# Patient Record
Sex: FEMALE | Race: WHITE | NOT HISPANIC OR LATINO | Employment: OTHER | URBAN - METROPOLITAN AREA
[De-identification: names, ages, dates, MRNs, and addresses within clinical notes are randomized per-mention and may not be internally consistent; named-entity substitution may affect disease eponyms.]

---

## 2019-07-15 ENCOUNTER — HOSPITAL ENCOUNTER (EMERGENCY)
Facility: HOSPITAL | Age: 64
Discharge: HOME | End: 2019-07-15
Attending: EMERGENCY MEDICINE
Payer: MEDICARE

## 2019-07-15 ENCOUNTER — APPOINTMENT (EMERGENCY)
Dept: CT IMAGING | Facility: HOSPITAL | Age: 64
End: 2019-07-15
Payer: MEDICARE

## 2019-07-15 VITALS
RESPIRATION RATE: 19 BRPM | WEIGHT: 150 LBS | DIASTOLIC BLOOD PRESSURE: 74 MMHG | SYSTOLIC BLOOD PRESSURE: 132 MMHG | HEIGHT: 61 IN | BODY MASS INDEX: 28.32 KG/M2 | OXYGEN SATURATION: 97 % | HEART RATE: 66 BPM | TEMPERATURE: 98.9 F

## 2019-07-15 DIAGNOSIS — R13.10 DYSPHAGIA: Primary | ICD-10-CM

## 2019-07-15 LAB
ANION GAP SERPL CALCULATED.3IONS-SCNC: 10 MMOL/L (ref 4–13)
BASOPHILS # BLD AUTO: 0.03 THOUSANDS/ΜL (ref 0–0.1)
BASOPHILS NFR BLD AUTO: 0 % (ref 0–1)
BUN SERPL-MCNC: 12 MG/DL (ref 5–25)
CALCIUM SERPL-MCNC: 9.6 MG/DL (ref 8.3–10.1)
CHLORIDE SERPL-SCNC: 108 MMOL/L (ref 100–108)
CO2 SERPL-SCNC: 26 MMOL/L (ref 21–32)
CREAT SERPL-MCNC: 0.77 MG/DL (ref 0.6–1.3)
EOSINOPHIL # BLD AUTO: 0.11 THOUSAND/ΜL (ref 0–0.61)
EOSINOPHIL NFR BLD AUTO: 2 % (ref 0–6)
ERYTHROCYTE [DISTWIDTH] IN BLOOD BY AUTOMATED COUNT: 13 % (ref 11.6–15.1)
GFR SERPL CREATININE-BSD FRML MDRD: 81 ML/MIN/1.73SQ M
GLUCOSE SERPL-MCNC: 105 MG/DL (ref 65–140)
HCT VFR BLD AUTO: 43.1 % (ref 34.8–46.1)
HGB BLD-MCNC: 14 G/DL (ref 11.5–15.4)
IMM GRANULOCYTES # BLD AUTO: 0.05 THOUSAND/UL (ref 0–0.2)
IMM GRANULOCYTES NFR BLD AUTO: 1 % (ref 0–2)
LYMPHOCYTES # BLD AUTO: 2.25 THOUSANDS/ΜL (ref 0.6–4.47)
LYMPHOCYTES NFR BLD AUTO: 30 % (ref 14–44)
MCH RBC QN AUTO: 29.6 PG (ref 26.8–34.3)
MCHC RBC AUTO-ENTMCNC: 32.5 G/DL (ref 31.4–37.4)
MCV RBC AUTO: 91 FL (ref 82–98)
MONOCYTES # BLD AUTO: 0.6 THOUSAND/ΜL (ref 0.17–1.22)
MONOCYTES NFR BLD AUTO: 8 % (ref 4–12)
NEUTROPHILS # BLD AUTO: 4.45 THOUSANDS/ΜL (ref 1.85–7.62)
NEUTS SEG NFR BLD AUTO: 59 % (ref 43–75)
NRBC BLD AUTO-RTO: 0 /100 WBCS
PLATELET # BLD AUTO: 213 THOUSANDS/UL (ref 149–390)
PMV BLD AUTO: 11.6 FL (ref 8.9–12.7)
POTASSIUM SERPL-SCNC: 4.7 MMOL/L (ref 3.5–5.3)
RBC # BLD AUTO: 4.73 MILLION/UL (ref 3.81–5.12)
SODIUM SERPL-SCNC: 144 MMOL/L (ref 136–145)
WBC # BLD AUTO: 7.49 THOUSAND/UL (ref 4.31–10.16)

## 2019-07-15 PROCEDURE — 99284 EMERGENCY DEPT VISIT MOD MDM: CPT | Performed by: EMERGENCY MEDICINE

## 2019-07-15 PROCEDURE — 99284 EMERGENCY DEPT VISIT MOD MDM: CPT

## 2019-07-15 PROCEDURE — 85025 COMPLETE CBC W/AUTO DIFF WBC: CPT | Performed by: EMERGENCY MEDICINE

## 2019-07-15 PROCEDURE — 80048 BASIC METABOLIC PNL TOTAL CA: CPT | Performed by: EMERGENCY MEDICINE

## 2019-07-15 PROCEDURE — 71260 CT THORAX DX C+: CPT

## 2019-07-15 PROCEDURE — 70491 CT SOFT TISSUE NECK W/DYE: CPT

## 2019-07-15 PROCEDURE — 36415 COLL VENOUS BLD VENIPUNCTURE: CPT | Performed by: EMERGENCY MEDICINE

## 2019-07-15 RX ADMIN — IOHEXOL 100 ML: 350 INJECTION, SOLUTION INTRAVENOUS at 16:27

## 2019-07-15 NOTE — ED PROVIDER NOTES
History  Chief Complaint   Patient presents with    Difficulty Swallowing     pt c/o sore throt and pain with swallowing after choking episode 2 days ago     70year old female patient presents emergency department for evaluation of difficulty swallowing  This is episodic, it is not consistent, this happened twice over the last several days and was concerned the patient  She currently has no difficulty speaking, she has no difficulty holding or in secretions, I do not feel this is a food bolus, I am concerned more for dysphagia secondary to esophageal stricture by the patient's description  She has no difficulty tolerating liquids, no difficulty tolerating other solids other than these two episodes  Because her description, CT scan of the soft tissue neck and chest were done to assess for any kind of a mass which may be obstructing if not is present patient follow up with GI for evaluation dysphagia  History provided by:  Patient   used: No    Medical Problem   Severity:  Mild  Onset quality:  Gradual  Timing:  Constant  Chronicity:  New  Associated symptoms: no abdominal pain, no ear pain, no fever, no myalgias and no wheezing        None       Past Medical History:   Diagnosis Date    Migraines        Past Surgical History:   Procedure Laterality Date    HYSTERECTOMY         History reviewed  No pertinent family history  I have reviewed and agree with the history as documented  Social History     Tobacco Use    Smoking status: Never Smoker    Smokeless tobacco: Never Used   Substance Use Topics    Alcohol use: Not Currently    Drug use: Never        Review of Systems   Constitutional: Negative for fever  HENT: Negative for ear pain  Respiratory: Negative for wheezing  Gastrointestinal: Negative for abdominal pain  Musculoskeletal: Negative for myalgias  All other systems reviewed and are negative        Physical Exam  Physical Exam   Constitutional: She is oriented to person, place, and time  She appears well-developed and well-nourished  HENT:   Head: Normocephalic and atraumatic  Right Ear: External ear normal    Left Ear: External ear normal    Eyes: Conjunctivae and EOM are normal    Neck: No JVD present  No tracheal deviation present  No thyromegaly present  Cardiovascular: Normal rate  Pulmonary/Chest: Effort normal and breath sounds normal  No stridor  Abdominal: Soft  She exhibits no distension and no mass  There is no tenderness  There is no guarding  No hernia  Musculoskeletal: Normal range of motion  She exhibits no edema, tenderness or deformity  Lymphadenopathy:     She has no cervical adenopathy  Neurological: She is alert and oriented to person, place, and time  Skin: Skin is warm  No rash noted  No erythema  No pallor  Psychiatric: She has a normal mood and affect  Her behavior is normal    Nursing note and vitals reviewed        Vital Signs  ED Triage Vitals [07/15/19 1206]   Temperature Pulse Respirations Blood Pressure SpO2   98 9 °F (37 2 °C) 73 18 152/73 96 %      Temp Source Heart Rate Source Patient Position - Orthostatic VS BP Location FiO2 (%)   Oral Monitor Sitting Left arm --      Pain Score       --           Vitals:    07/15/19 1206   BP: 152/73   Pulse: 73   Patient Position - Orthostatic VS: Sitting         Visual Acuity      ED Medications  Medications   iohexol (OMNIPAQUE) 350 MG/ML injection (MULTI-DOSE) 100 mL (100 mL Intravenous Given 7/15/19 1627)       Diagnostic Studies  Results Reviewed     Procedure Component Value Units Date/Time    Basic metabolic panel [[id number]] Collected:  07/15/19 1523    Lab Status:  Final result Specimen:  Blood from Hand, Left Updated:  07/15/19 1539     Sodium 144 mmol/L      Potassium 4 7 mmol/L      Chloride 108 mmol/L      CO2 26 mmol/L      ANION GAP 10 mmol/L      BUN 12 mg/dL      Creatinine 0 77 mg/dL      Glucose 105 mg/dL      Calcium 9 6 mg/dL      eGFR 81 ml/min/1 73sq m Narrative:       National Kidney Disease Foundation guidelines for Chronic Kidney Disease (CKD):     Stage 1 with normal or high GFR (GFR > 90 mL/min/1 73 square meters)    Stage 2 Mild CKD (GFR = 60-89 mL/min/1 73 square meters)    Stage 3A Moderate CKD (GFR = 45-59 mL/min/1 73 square meters)    Stage 3B Moderate CKD (GFR = 30-44 mL/min/1 73 square meters)    Stage 4 Severe CKD (GFR = 15-29 mL/min/1 73 square meters)    Stage 5 End Stage CKD (GFR <15 mL/min/1 73 square meters)  Note: GFR calculation is accurate only with a steady state creatinine    CBC and differential [[id number]] Collected:  07/15/19 1454    Lab Status:  Final result Specimen:  Blood from Arm, Left Updated:  07/15/19 1507     WBC 7 49 Thousand/uL      RBC 4 73 Million/uL      Hemoglobin 14 0 g/dL      Hematocrit 43 1 %      MCV 91 fL      MCH 29 6 pg      MCHC 32 5 g/dL      RDW 13 0 %      MPV 11 6 fL      Platelets 522 Thousands/uL      nRBC 0 /100 WBCs      Neutrophils Relative 59 %      Immat GRANS % 1 %      Lymphocytes Relative 30 %      Monocytes Relative 8 %      Eosinophils Relative 2 %      Basophils Relative 0 %      Neutrophils Absolute 4 45 Thousands/µL      Immature Grans Absolute 0 05 Thousand/uL      Lymphocytes Absolute 2 25 Thousands/µL      Monocytes Absolute 0 60 Thousand/µL      Eosinophils Absolute 0 11 Thousand/µL      Basophils Absolute 0 03 Thousands/µL                  CT soft tissue neck with contrast   Final Result by Trixie Pike MD (07/15 1656)      No airway pathology or pathologic adenopathy  Workstation performed: [id number]         CT chest with contrast   Final Result by Sharyn Nazario MD (07/15 1650)      No findings to account for this patient's difficulty swallowing           Workstation performed: [id number]                    Procedures  Procedures       ED Course                               MDM  Number of Diagnoses or Management Options  Dysphagia: new and requires workup Amount and/or Complexity of Data Reviewed  Decide to obtain previous medical records or to obtain history from someone other than the patient: yes  Review and summarize past medical records: yes    Patient Progress  Patient progress: stable      Disposition  Final diagnoses:   Dysphagia     Time reflects when diagnosis was documented in both MDM as applicable and the Disposition within this note     Time User Action Codes Description Comment    7/15/2019  5:16 PM Paty Jerome Add [R13 10] Dysphagia       ED Disposition     ED Disposition Condition Date/Time Comment    Discharge Stable Mon Jul 15, 2019  5:16 PM Zuleyka Mcclendon discharge to home/self care  Follow-up Information     Follow up With Specialties Details Why Contact Info    Marleny Marie MD Gastroenterology   [address]  [address]  [phone]            Patient's Medications    No medications on file     No discharge procedures on file      ED Provider  Electronically Signed by           Jonna Juárez DO  07/15/19 9850

## 2019-08-23 ENCOUNTER — OFFICE VISIT (OUTPATIENT)
Dept: GASTROENTEROLOGY | Facility: CLINIC | Age: 64
End: 2019-08-23
Payer: MEDICARE

## 2019-08-23 ENCOUNTER — PREP FOR PROCEDURE (OUTPATIENT)
Dept: GASTROENTEROLOGY | Facility: CLINIC | Age: 64
End: 2019-08-23

## 2019-08-23 VITALS
BODY MASS INDEX: 29.95 KG/M2 | WEIGHT: 158.5 LBS | SYSTOLIC BLOOD PRESSURE: 122 MMHG | HEART RATE: 62 BPM | DIASTOLIC BLOOD PRESSURE: 78 MMHG

## 2019-08-23 DIAGNOSIS — R13.19 ESOPHAGEAL DYSPHAGIA: Primary | ICD-10-CM

## 2019-08-23 DIAGNOSIS — Z12.11 COLON CANCER SCREENING: ICD-10-CM

## 2019-08-23 PROCEDURE — 99204 OFFICE O/P NEW MOD 45 MIN: CPT | Performed by: INTERNAL MEDICINE

## 2019-08-23 RX ORDER — LORAZEPAM 1 MG/1
1 TABLET ORAL 2 TIMES DAILY PRN
Refills: 0 | COMMUNITY
Start: 2019-06-26

## 2019-08-23 RX ORDER — OXYBUTYNIN CHLORIDE 15 MG/1
15 TABLET, EXTENDED RELEASE ORAL DAILY
Refills: 1 | COMMUNITY
Start: 2019-07-20

## 2019-08-23 RX ORDER — ATORVASTATIN CALCIUM 20 MG/1
TABLET, FILM COATED ORAL
Refills: 0 | COMMUNITY
Start: 2019-07-22

## 2019-08-23 RX ORDER — PANTOPRAZOLE SODIUM 40 MG/1
40 TABLET, DELAYED RELEASE ORAL DAILY
Qty: 30 TABLET | Refills: 1 | Status: SHIPPED | OUTPATIENT
Start: 2019-08-23 | End: 2019-09-14 | Stop reason: SDUPTHER

## 2019-08-23 RX ORDER — NAPROXEN 500 MG/1
TABLET ORAL AS NEEDED
COMMUNITY
Start: 2019-08-19

## 2019-08-23 RX ORDER — DICLOFENAC SODIUM 75 MG/1
75 TABLET, DELAYED RELEASE ORAL 2 TIMES DAILY
Refills: 0 | COMMUNITY
Start: 2019-05-20

## 2019-08-23 NOTE — H&P (VIEW-ONLY)
Consultation - [facility]  Bina Mendez 1955 70 y o  female     ASSESSMENT @ PLAN:   She is a 75-year-old female with progressive solid food dysphagia and neck globus sensation over the last month without overt peptic symptoms  In addition she has never had colon cancer screening and she has no family history and no symptoms  1 will do EGD and colonoscopy to investigate    2 will give Protonix 40 mg daily for 8 weeks    Chief Complaint:  Dysphagia and colon cancer screening    HPI:   She is a 75-year-old female with ongoing solid food dysphagia that is becoming constant  She reports that is been ongoing for about a month  She originally noticed it when she started eating rice and now she has it with any solid food  She reports a tightness and a fullness in her neck  She reports almost a globus sensation at all times  She has no overt peptic symptoms  She has no heartburn regurgitation no nausea no vomiting  No belching  She denies NSAID use  She has no melena or weight loss  She has never had an endoscopy or colonoscopy  She had an emergency room visit and she had a CT of the chest and the neck that were negative  She has nobody in the family has colon cancer screening she needs colon cancer screening  She has no diarrhea constipation melena hematochezia or abdominal pain  REVIEW OF SYSTEMS:     CONSTITUTIONAL: Denies any fever, chills, or rigors  Good appetite, and no recent weight loss  HEENT: No earache or tinnitus  Denies hearing loss or visual disturbances  CARDIOVASCULAR: No chest pain or palpitations  RESPIRATORY: Denies any cough, hemoptysis, shortness of breath or dyspnea on exertion  GASTROINTESTINAL: As noted in the History of Present Illness  GENITOURINARY: No problems with urination  Denies any hematuria or dysuria  NEUROLOGIC: No dizziness or vertigo, denies headaches  MUSCULOSKELETAL: Denies any muscle or joint pain     SKIN: Denies skin rashes or itching  ENDOCRINE: Denies excessive thirst  Denies intolerance to heat or cold  PSYCHOSOCIAL: Denies depression or anxiety  Denies any recent memory loss  Past Medical History:   Diagnosis Date    Hyperlipidemia     Migraines       Past Surgical History:   Procedure Laterality Date     SECTION      two     HYSTERECTOMY      REPLACEMENT TOTAL KNEE      SHOULDER ARTHROSCOPY       Social History     Socioeconomic History    Marital status: /Civil Union     Spouse name: Not on file    Number of children: Not on file    Years of education: Not on file    Highest education level: Not on file   Occupational History    Not on file   Social Needs    Financial resource strain: Not on file    Food insecurity:     Worry: Not on file     Inability: Not on file    Transportation needs:     Medical: Not on file     Non-medical: Not on file   Tobacco Use    Smoking status: Never Smoker    Smokeless tobacco: Never Used   Substance and Sexual Activity    Alcohol use: Not Currently    Drug use: Never    Sexual activity: Not on file   Lifestyle    Physical activity:     Days per week: Not on file     Minutes per session: Not on file    Stress: Not on file   Relationships    Social connections:     Talks on phone: Not on file     Gets together: Not on file     Attends Holiness service: Not on file     Active member of club or organization: Not on file     Attends meetings of clubs or organizations: Not on file     Relationship status: Not on file    Intimate partner violence:     Fear of current or ex partner: Not on file     Emotionally abused: Not on file     Physically abused: Not on file     Forced sexual activity: Not on file   Other Topics Concern    Not on file   Social History Narrative    Not on file     Family History   Problem Relation Age of Onset    Diabetes Father      Patient has no known allergies    Current Outpatient Medications   Medication Sig Dispense Refill    atorvastatin (LIPITOR) 20 mg tablet TAKE 1 TABLET BY MOUTH EVERY DAY AT NIGHT  0    diclofenac (VOLTAREN) 75 mg EC tablet Take 75 mg by mouth 2 (two) times a day  0    LORazepam (ATIVAN) 1 mg tablet Take 1 mg by mouth 2 (two) times a day as needed  0    naproxen (NAPROSYN) 500 mg tablet as needed      oxybutynin (DITROPAN XL) 15 MG 24 hr tablet Take 15 mg by mouth daily  1    patient supplied medication       topiramate (TOPAMAX) 200 MG tablet Take 200 mg by mouth 2 (two) times a day  1    pantoprazole (PROTONIX) 40 mg tablet Take 1 tablet (40 mg total) by mouth daily 30 tablet 1     No current facility-administered medications for this visit  Blood pressure 122/78, pulse 62, weight 71 9 kg (158 lb 8 oz)  PHYSICAL EXAM:     General Appearance:   Alert, cooperative, no distress, appears stated age    HEENT:   Normocephalic, atraumatic, anicteric      Neck:  Supple, symmetrical, trachea midline, no adenopathy;    thyroid: no enlargement/tenderness/nodules; no carotid  bruit or JVD    Lungs:   Clear to auscultation bilaterally; no rales, rhonchi or wheezing; respirations unlabored    Heart[de-identified]   S1 and S2 normal; regular rate and rhythm; no murmur, rub, or gallop     Abdomen:   Soft, non-tender, non-distended; normal bowel sounds; no masses, no organomegaly    Genitalia:   Deferred    Rectal:   Deferred    Extremities:  No cyanosis, clubbing or edema    Pulses:  2+ and symmetric all extremities    Skin:  Skin color, texture, turgor normal, no rashes or lesions    Lymph nodes:  No palpable cervical, axillary or inguinal lymphadenopathy        Lab Results   Component Value Date    WBC 7 49 07/15/2019    HGB 14 0 07/15/2019    HCT 43 1 07/15/2019    MCV 91 07/15/2019     07/15/2019     Lab Results   Component Value Date    CALCIUM 9 6 07/15/2019    K 4 7 07/15/2019    CO2 26 07/15/2019     07/15/2019    BUN 12 07/15/2019    CREATININE 0 77 07/15/2019     No results found for: ALT, AST, GGT, ALKPHOS, BILITOT  No results found for: INR, PROTIME

## 2019-08-23 NOTE — PROGRESS NOTES
Consultation - [facility]  Bina Mendez 1955 70 y o  female     ASSESSMENT @ PLAN:   She is a 57-year-old female with progressive solid food dysphagia and neck globus sensation over the last month without overt peptic symptoms  In addition she has never had colon cancer screening and she has no family history and no symptoms  1 will do EGD and colonoscopy to investigate    2 will give Protonix 40 mg daily for 8 weeks    Chief Complaint:  Dysphagia and colon cancer screening    HPI:   She is a 57-year-old female with ongoing solid food dysphagia that is becoming constant  She reports that is been ongoing for about a month  She originally noticed it when she started eating rice and now she has it with any solid food  She reports a tightness and a fullness in her neck  She reports almost a globus sensation at all times  She has no overt peptic symptoms  She has no heartburn regurgitation no nausea no vomiting  No belching  She denies NSAID use  She has no melena or weight loss  She has never had an endoscopy or colonoscopy  She had an emergency room visit and she had a CT of the chest and the neck that were negative  She has nobody in the family has colon cancer screening she needs colon cancer screening  She has no diarrhea constipation melena hematochezia or abdominal pain  REVIEW OF SYSTEMS:     CONSTITUTIONAL: Denies any fever, chills, or rigors  Good appetite, and no recent weight loss  HEENT: No earache or tinnitus  Denies hearing loss or visual disturbances  CARDIOVASCULAR: No chest pain or palpitations  RESPIRATORY: Denies any cough, hemoptysis, shortness of breath or dyspnea on exertion  GASTROINTESTINAL: As noted in the History of Present Illness  GENITOURINARY: No problems with urination  Denies any hematuria or dysuria  NEUROLOGIC: No dizziness or vertigo, denies headaches  MUSCULOSKELETAL: Denies any muscle or joint pain     SKIN: Denies skin rashes or itching  ENDOCRINE: Denies excessive thirst  Denies intolerance to heat or cold  PSYCHOSOCIAL: Denies depression or anxiety  Denies any recent memory loss  Past Medical History:   Diagnosis Date    Hyperlipidemia     Migraines       Past Surgical History:   Procedure Laterality Date     SECTION      two     HYSTERECTOMY      REPLACEMENT TOTAL KNEE      SHOULDER ARTHROSCOPY       Social History     Socioeconomic History    Marital status: /Civil Union     Spouse name: Not on file    Number of children: Not on file    Years of education: Not on file    Highest education level: Not on file   Occupational History    Not on file   Social Needs    Financial resource strain: Not on file    Food insecurity:     Worry: Not on file     Inability: Not on file    Transportation needs:     Medical: Not on file     Non-medical: Not on file   Tobacco Use    Smoking status: Never Smoker    Smokeless tobacco: Never Used   Substance and Sexual Activity    Alcohol use: Not Currently    Drug use: Never    Sexual activity: Not on file   Lifestyle    Physical activity:     Days per week: Not on file     Minutes per session: Not on file    Stress: Not on file   Relationships    Social connections:     Talks on phone: Not on file     Gets together: Not on file     Attends [employer] service: Not on file     Active member of club or organization: Not on file     Attends meetings of clubs or organizations: Not on file     Relationship status: Not on file    Intimate partner violence:     Fear of current or ex partner: Not on file     Emotionally abused: Not on file     Physically abused: Not on file     Forced sexual activity: Not on file   Other Topics Concern    Not on file   Social History Narrative    Not on file     Family History   Problem Relation Age of Onset    Diabetes Father      Patient has no known allergies    Current Outpatient Medications   Medication Sig Dispense Refill    atorvastatin (LIPITOR) 20 mg tablet TAKE 1 TABLET BY MOUTH EVERY DAY AT NIGHT  0    diclofenac (VOLTAREN) 75 mg EC tablet Take 75 mg by mouth 2 (two) times a day  0    LORazepam (ATIVAN) 1 mg tablet Take 1 mg by mouth 2 (two) times a day as needed  0    naproxen (NAPROSYN) 500 mg tablet as needed      oxybutynin (DITROPAN XL) 15 MG 24 hr tablet Take 15 mg by mouth daily  1    patient supplied medication       topiramate (TOPAMAX) 200 MG tablet Take 200 mg by mouth 2 (two) times a day  1    pantoprazole (PROTONIX) 40 mg tablet Take 1 tablet (40 mg total) by mouth daily 30 tablet 1     No current facility-administered medications for this visit  Blood pressure 122/78, pulse 62, weight 71 9 kg (158 lb 8 oz)  PHYSICAL EXAM:     General Appearance:   Alert, cooperative, no distress, appears stated age    HEENT:   Normocephalic, atraumatic, anicteric      Neck:  Supple, symmetrical, trachea midline, no adenopathy;    thyroid: no enlargement/tenderness/nodules; no carotid  bruit or JVD    Lungs:   Clear to auscultation bilaterally; no rales, rhonchi or wheezing; respirations unlabored    Heart[de-identified]   S1 and S2 normal; regular rate and rhythm; no murmur, rub, or gallop     Abdomen:   Soft, non-tender, non-distended; normal bowel sounds; no masses, no organomegaly    Genitalia:   Deferred    Rectal:   Deferred    Extremities:  No cyanosis, clubbing or edema    Pulses:  2+ and symmetric all extremities    Skin:  Skin color, texture, turgor normal, no rashes or lesions    Lymph nodes:  No palpable cervical, axillary or inguinal lymphadenopathy        Lab Results   Component Value Date    WBC 7 49 07/15/2019    HGB 14 0 07/15/2019    HCT 43 1 07/15/2019    MCV 91 07/15/2019     07/15/2019     Lab Results   Component Value Date    CALCIUM 9 6 07/15/2019    K 4 7 07/15/2019    CO2 26 07/15/2019     07/15/2019    BUN 12 07/15/2019    CREATININE 0 77 07/15/2019     No results found for: ALT, AST, GGT, ALKPHOS, BILITOT  No results found for: INR, PROTIME

## 2019-08-27 ENCOUNTER — ANESTHESIA (OUTPATIENT)
Dept: GASTROENTEROLOGY | Facility: HOSPITAL | Age: 64
End: 2019-08-27

## 2019-08-27 ENCOUNTER — ANESTHESIA EVENT (OUTPATIENT)
Dept: GASTROENTEROLOGY | Facility: HOSPITAL | Age: 64
End: 2019-08-27

## 2019-08-27 ENCOUNTER — HOSPITAL ENCOUNTER (OUTPATIENT)
Dept: GASTROENTEROLOGY | Facility: HOSPITAL | Age: 64
Setting detail: OUTPATIENT SURGERY
Discharge: HOME | End: 2019-08-27
Attending: INTERNAL MEDICINE
Payer: MEDICARE

## 2019-08-27 VITALS
BODY MASS INDEX: 28.84 KG/M2 | RESPIRATION RATE: 18 BRPM | OXYGEN SATURATION: 98 % | HEIGHT: 61 IN | SYSTOLIC BLOOD PRESSURE: 138 MMHG | HEART RATE: 70 BPM | TEMPERATURE: 97.6 F | DIASTOLIC BLOOD PRESSURE: 81 MMHG | WEIGHT: 152.78 LBS

## 2019-08-27 DIAGNOSIS — R13.19 ESOPHAGEAL DYSPHAGIA: ICD-10-CM

## 2019-08-27 DIAGNOSIS — Z12.11 COLON CANCER SCREENING: ICD-10-CM

## 2019-08-27 PROCEDURE — 88305 TISSUE EXAM BY PATHOLOGIST: CPT | Performed by: PATHOLOGY

## 2019-08-27 PROCEDURE — 43239 EGD BIOPSY SINGLE/MULTIPLE: CPT | Performed by: INTERNAL MEDICINE

## 2019-08-27 PROCEDURE — NC001 PR NO CHARGE: Performed by: INTERNAL MEDICINE

## 2019-08-27 PROCEDURE — G0121 COLON CA SCRN NOT HI RSK IND: HCPCS | Performed by: INTERNAL MEDICINE

## 2019-08-27 RX ORDER — SODIUM CHLORIDE, SODIUM LACTATE, POTASSIUM CHLORIDE, CALCIUM CHLORIDE 600; 310; 30; 20 MG/100ML; MG/100ML; MG/100ML; MG/100ML
125 INJECTION, SOLUTION INTRAVENOUS CONTINUOUS
Status: DISCONTINUED | OUTPATIENT
Start: 2019-08-27 | End: 2019-08-27

## 2019-08-27 RX ORDER — PROPOFOL 10 MG/ML
INJECTION, EMULSION INTRAVENOUS AS NEEDED
Status: DISCONTINUED | OUTPATIENT
Start: 2019-08-27 | End: 2019-08-27 | Stop reason: SURG

## 2019-08-27 RX ADMIN — PROPOFOL 20 MG: 10 INJECTION, EMULSION INTRAVENOUS at 13:35

## 2019-08-27 RX ADMIN — PROPOFOL 100 MG: 10 INJECTION, EMULSION INTRAVENOUS at 13:33

## 2019-08-27 RX ADMIN — PROPOFOL 20 MG: 10 INJECTION, EMULSION INTRAVENOUS at 13:42

## 2019-08-27 RX ADMIN — PROPOFOL 20 MG: 10 INJECTION, EMULSION INTRAVENOUS at 13:46

## 2019-08-27 RX ADMIN — PROPOFOL 30 MG: 10 INJECTION, EMULSION INTRAVENOUS at 13:38

## 2019-08-27 RX ADMIN — SODIUM CHLORIDE, SODIUM LACTATE, POTASSIUM CHLORIDE, AND CALCIUM CHLORIDE: .6; .31; .03; .02 INJECTION, SOLUTION INTRAVENOUS at 13:20

## 2019-08-27 NOTE — DISCHARGE INSTRUCTIONS

## 2019-08-27 NOTE — ANESTHESIA PREPROCEDURE EVALUATION
Review of Systems/Medical History  Patient summary reviewed  Chart reviewed  No history of anesthetic complications     Cardiovascular  Hyperlipidemia,    Pulmonary  Negative pulmonary ROS        GI/Hepatic    GERD ,        Negative  ROS        Endo/Other  Negative endo/other ROS      GYN       Hematology  Negative hematology ROS      Musculoskeletal  Negative musculoskeletal ROS        Neurology    Headaches,    Psychology   Anxiety,              Physical Exam    Airway    Mallampati score: II  TM Distance: >3 FB  Neck ROM: full     Dental       Cardiovascular      Pulmonary      Other Findings        Anesthesia Plan  ASA Score- 2     Anesthesia Type- IV sedation with anesthesia with ASA Monitors  Additional Monitors:   Airway Plan:         Plan Factors-    Induction- intravenous  Postoperative Plan-     Informed Consent- Anesthetic plan and risks discussed with patient  I personally reviewed this patient with the CRNA  Discussed and agreed on the Anesthesia Plan with the CRNA  Lalita Smith

## 2019-08-27 NOTE — ANESTHESIA POSTPROCEDURE EVALUATION
Post-Op Assessment Note    CV Status:  Stable  Pain Score: 0    Pain management: adequate     Mental Status:  Alert and awake   Hydration Status:  Stable   PONV Controlled:  None   Airway Patency:  Patent and adequate   Post Op Vitals Reviewed: Yes      Staff: Anesthesiologist, CRNA

## 2019-08-27 NOTE — INTERVAL H&P NOTE
H&P reviewed  After examining the patient I find no changes in the patients condition since the H&P had been written      Vitals:    08/27/19 1314   BP: 144/67   Pulse: 73   Resp: 18   Temp: 98 8 °F (37 1 °C)   SpO2: 98%

## 2019-08-30 ENCOUNTER — TELEPHONE (OUTPATIENT)
Dept: GASTROENTEROLOGY | Facility: CLINIC | Age: 64
End: 2019-08-30

## 2019-08-30 NOTE — TELEPHONE ENCOUNTER
----- Message from Rufus Mckeon MD sent at 8/30/2019  9:58 AM EDT -----  pls tell her path is negative

## 2019-09-14 DIAGNOSIS — R13.19 ESOPHAGEAL DYSPHAGIA: ICD-10-CM

## 2019-09-16 RX ORDER — PANTOPRAZOLE SODIUM 40 MG/1
TABLET, DELAYED RELEASE ORAL
Qty: 30 TABLET | Refills: 1 | Status: SHIPPED | OUTPATIENT
Start: 2019-09-16 | End: 2019-10-10 | Stop reason: SDUPTHER

## 2019-09-17 ENCOUNTER — TELEPHONE (OUTPATIENT)
Dept: GASTROENTEROLOGY | Facility: CLINIC | Age: 64
End: 2019-09-17

## 2019-09-17 NOTE — TELEPHONE ENCOUNTER
Kary Shown pt - Pt still having choking issue while eating, should she make an appointment to see Dr Preston Shown  Please advise  Call [phone]   Ty

## 2019-09-17 NOTE — TELEPHONE ENCOUNTER
Spoke with patient history of: dysphagia, neck globus sensation    Patient c/o an episode of dysphagia last night after eating rice  This is her second episode  She denies this happening with any other foods or liquid, no pain  Denies reflux, fever, chills, n/v  EGD/COLON are normal   She continues pantoprazole 40mg daily  Advised patient to continue to monitor symptoms  She understands if symptom persists when eating all solid foods to let us know and we can proceed with a manometry  Any other suggestions?

## 2019-10-10 DIAGNOSIS — R13.19 ESOPHAGEAL DYSPHAGIA: ICD-10-CM

## 2019-10-10 RX ORDER — PANTOPRAZOLE SODIUM 40 MG/1
TABLET, DELAYED RELEASE ORAL
Qty: 30 TABLET | Refills: 1 | Status: SHIPPED | OUTPATIENT
Start: 2019-10-10

## 2019-12-03 DIAGNOSIS — K21.9 GASTROESOPHAGEAL REFLUX DISEASE WITHOUT ESOPHAGITIS: Primary | ICD-10-CM

## 2019-12-03 RX ORDER — PANTOPRAZOLE SODIUM 40 MG/1
40 TABLET, DELAYED RELEASE ORAL 2 TIMES DAILY
Qty: 60 TABLET | Refills: 2 | Status: SHIPPED | OUTPATIENT
Start: 2019-12-03 | End: 2020-04-28 | Stop reason: SDUPTHER

## 2019-12-03 NOTE — TELEPHONE ENCOUNTER
Spoke with patient  Patient states her dysphagia restarted and she would like pantoprazole again  Patient was previously on pantoprazole 40mg daily   We will send pantoprazole 40mg BID, and she will call us if symptoms persist

## 2019-12-03 NOTE — TELEPHONE ENCOUNTER
Dr Minerva Hatfield - Patient called lmom - patient talked to Dr Minerva Hatfield yesterday, was told to call office and speak with Sonia Andres regarding pantoprazole medication  Patient needs a stronger dose  Patient has questions   Please call Lorne Nevarez at [phone]

## 2020-04-28 DIAGNOSIS — K21.9 GASTROESOPHAGEAL REFLUX DISEASE WITHOUT ESOPHAGITIS: ICD-10-CM

## 2020-04-28 RX ORDER — PANTOPRAZOLE SODIUM 40 MG/1
40 TABLET, DELAYED RELEASE ORAL 2 TIMES DAILY
Qty: 180 TABLET | Refills: 3 | Status: SHIPPED | OUTPATIENT
Start: 2020-04-28 | End: 2020-07-27

## 2020-06-24 ENCOUNTER — HOSPITAL ENCOUNTER (EMERGENCY)
Facility: HOSPITAL | Age: 65
Discharge: HOME | End: 2020-06-24
Attending: EMERGENCY MEDICINE
Payer: MEDICARE

## 2020-06-24 ENCOUNTER — APPOINTMENT (EMERGENCY)
Dept: RADIOLOGY | Facility: HOSPITAL | Age: 65
End: 2020-06-24
Payer: MEDICARE

## 2020-06-24 VITALS
DIASTOLIC BLOOD PRESSURE: 58 MMHG | TEMPERATURE: 98.7 F | WEIGHT: 150 LBS | OXYGEN SATURATION: 97 % | RESPIRATION RATE: 18 BRPM | HEIGHT: 61 IN | SYSTOLIC BLOOD PRESSURE: 119 MMHG | HEART RATE: 72 BPM | BODY MASS INDEX: 28.32 KG/M2
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
## 2020-06-24 DIAGNOSIS — S60.222A CONTUSION OF HAND, LEFT: ICD-10-CM

## 2020-06-24 DIAGNOSIS — S60.221A CONTUSION OF RIGHT HAND, INITIAL ENCOUNTER: ICD-10-CM

## 2020-06-24 PROCEDURE — 99283 EMERGENCY DEPT VISIT LOW MDM: CPT

## 2020-06-24 PROCEDURE — 99284 EMERGENCY DEPT VISIT MOD MDM: CPT | Performed by: EMERGENCY MEDICINE

## 2020-06-24 PROCEDURE — 73110 X-RAY EXAM OF WRIST: CPT

## 2020-06-24 PROCEDURE — 73130 X-RAY EXAM OF HAND: CPT

## 2020-06-24 PROCEDURE — 96372 THER/PROPH/DIAG INJ SC/IM: CPT

## 2020-06-24 RX ORDER — KETOROLAC TROMETHAMINE 30 MG/ML
15 INJECTION, SOLUTION INTRAMUSCULAR; INTRAVENOUS ONCE
Status: COMPLETED | OUTPATIENT
Start: 2020-06-24 | End: 2020-06-24

## 2020-06-24 RX ADMIN — KETOROLAC TROMETHAMINE 15 MG: 30 INJECTION, SOLUTION INTRAMUSCULAR at 20:26

## 2021-02-05 ENCOUNTER — HOSPITAL ENCOUNTER (OUTPATIENT)
Facility: HOSPITAL | Age: 66
Setting detail: OBSERVATION
Discharge: HOME | End: 2021-02-06
Attending: EMERGENCY MEDICINE | Admitting: INTERNAL MEDICINE
Payer: MEDICARE

## 2021-02-05 DIAGNOSIS — E78.5 HYPERLIPIDEMIA: ICD-10-CM

## 2021-02-05 DIAGNOSIS — R42 DIZZINESS: Primary | ICD-10-CM

## 2021-02-05 PROCEDURE — 1124F ACP DISCUSS-NO DSCNMKR DOCD: CPT | Performed by: PHYSICIAN ASSISTANT

## 2021-02-05 PROCEDURE — 99285 EMERGENCY DEPT VISIT HI MDM: CPT

## 2021-02-05 PROCEDURE — 93005 ELECTROCARDIOGRAM TRACING: CPT

## 2021-02-06 ENCOUNTER — APPOINTMENT (EMERGENCY)
Dept: CT IMAGING | Facility: HOSPITAL | Age: 66
End: 2021-02-06
Payer: MEDICARE

## 2021-02-06 ENCOUNTER — APPOINTMENT (OUTPATIENT)
Dept: MRI IMAGING | Facility: HOSPITAL | Age: 66
End: 2021-02-06
Payer: MEDICARE

## 2021-02-06 VITALS
HEIGHT: 61 IN | DIASTOLIC BLOOD PRESSURE: 69 MMHG | HEART RATE: 98 BPM | WEIGHT: 147.93 LBS | OXYGEN SATURATION: 97 % | BODY MASS INDEX: 27.93 KG/M2 | TEMPERATURE: 98.8 F | RESPIRATION RATE: 20 BRPM | SYSTOLIC BLOOD PRESSURE: 131 MMHG

## 2021-02-06 PROBLEM — E78.5 HYPERLIPIDEMIA: Status: ACTIVE | Noted: 2021-02-06

## 2021-02-06 PROBLEM — G43.909 MIGRAINES: Status: ACTIVE | Noted: 2021-02-06

## 2021-02-06 PROBLEM — R42 DIZZINESS: Status: ACTIVE | Noted: 2021-02-06

## 2021-02-06 LAB
ALBUMIN SERPL BCP-MCNC: 3.9 G/DL (ref 3.5–5)
ALP SERPL-CCNC: 79 U/L (ref 46–116)
ALT SERPL W P-5'-P-CCNC: 17 U/L (ref 12–78)
ANION GAP SERPL CALCULATED.3IONS-SCNC: 9 MMOL/L (ref 4–13)
AST SERPL W P-5'-P-CCNC: 10 U/L (ref 5–45)
ATRIAL RATE: 64 BPM
ATRIAL RATE: 85 BPM
BACTERIA UR QL AUTO: ABNORMAL /HPF
BASOPHILS # BLD AUTO: 0.03 THOUSANDS/ΜL (ref 0–0.1)
BASOPHILS NFR BLD AUTO: 1 % (ref 0–1)
BILIRUB DIRECT SERPL-MCNC: 0.09 MG/DL (ref 0–0.2)
BILIRUB SERPL-MCNC: 0.4 MG/DL (ref 0.2–1)
BILIRUB UR QL STRIP: NEGATIVE
BUN SERPL-MCNC: 13 MG/DL (ref 5–25)
CALCIUM SERPL-MCNC: 9 MG/DL (ref 8.3–10.1)
CHLORIDE SERPL-SCNC: 108 MMOL/L (ref 100–108)
CLARITY UR: CLEAR
CO2 SERPL-SCNC: 25 MMOL/L (ref 21–32)
COLOR UR: YELLOW
CREAT SERPL-MCNC: 0.96 MG/DL (ref 0.6–1.3)
EOSINOPHIL # BLD AUTO: 0.06 THOUSAND/ΜL (ref 0–0.61)
EOSINOPHIL NFR BLD AUTO: 1 % (ref 0–6)
ERYTHROCYTE [DISTWIDTH] IN BLOOD BY AUTOMATED COUNT: 12.7 % (ref 11.6–15.1)
GFR SERPL CREATININE-BSD FRML MDRD: 61 ML/MIN/1.73SQ M
GLUCOSE SERPL-MCNC: 103 MG/DL (ref 65–140)
GLUCOSE UR STRIP-MCNC: NEGATIVE MG/DL
HCT VFR BLD AUTO: 42.2 % (ref 34.8–46.1)
HGB BLD-MCNC: 13.7 G/DL (ref 11.5–15.4)
HGB UR QL STRIP.AUTO: NEGATIVE
IMM GRANULOCYTES # BLD AUTO: 0.03 THOUSAND/UL (ref 0–0.2)
IMM GRANULOCYTES NFR BLD AUTO: 1 % (ref 0–2)
KETONES UR STRIP-MCNC: NEGATIVE MG/DL
LEUKOCYTE ESTERASE UR QL STRIP: ABNORMAL
LYMPHOCYTES # BLD AUTO: 1.61 THOUSANDS/ΜL (ref 0.6–4.47)
LYMPHOCYTES NFR BLD AUTO: 27 % (ref 14–44)
MCH RBC QN AUTO: 29.2 PG (ref 26.8–34.3)
MCHC RBC AUTO-ENTMCNC: 32.5 G/DL (ref 31.4–37.4)
MCV RBC AUTO: 90 FL (ref 82–98)
MONOCYTES # BLD AUTO: 0.48 THOUSAND/ΜL (ref 0.17–1.22)
MONOCYTES NFR BLD AUTO: 8 % (ref 4–12)
NEUTROPHILS # BLD AUTO: 3.68 THOUSANDS/ΜL (ref 1.85–7.62)
NEUTS SEG NFR BLD AUTO: 62 % (ref 43–75)
NITRITE UR QL STRIP: NEGATIVE
NON-SQ EPI CELLS URNS QL MICRO: ABNORMAL /HPF
NRBC BLD AUTO-RTO: 0 /100 WBCS
P AXIS: 40 DEGREES
P AXIS: 48 DEGREES
PH UR STRIP.AUTO: 6.5 [PH]
PLATELET # BLD AUTO: 229 THOUSANDS/UL (ref 149–390)
PMV BLD AUTO: 10.3 FL (ref 8.9–12.7)
POTASSIUM SERPL-SCNC: 3.5 MMOL/L (ref 3.5–5.3)
PR INTERVAL: 140 MS
PR INTERVAL: 146 MS
PROT SERPL-MCNC: 7.5 G/DL (ref 6.4–8.2)
PROT UR STRIP-MCNC: NEGATIVE MG/DL
QRS AXIS: 54 DEGREES
QRS AXIS: 62 DEGREES
QRSD INTERVAL: 82 MS
QRSD INTERVAL: 84 MS
QT INTERVAL: 374 MS
QT INTERVAL: 430 MS
QTC INTERVAL: 443 MS
QTC INTERVAL: 445 MS
RBC # BLD AUTO: 4.69 MILLION/UL (ref 3.81–5.12)
RBC #/AREA URNS AUTO: ABNORMAL /HPF
SODIUM SERPL-SCNC: 142 MMOL/L (ref 136–145)
SP GR UR STRIP.AUTO: <=1.005 (ref 1–1.03)
T WAVE AXIS: 10 DEGREES
T WAVE AXIS: 34 DEGREES
TROPONIN I SERPL-MCNC: <0.02 NG/ML
UROBILINOGEN UR QL STRIP.AUTO: 0.2 E.U./DL
VENTRICULAR RATE: 64 BPM
VENTRICULAR RATE: 85 BPM
WBC # BLD AUTO: 5.89 THOUSAND/UL (ref 4.31–10.16)
WBC #/AREA URNS AUTO: ABNORMAL /HPF

## 2021-02-06 PROCEDURE — 70551 MRI BRAIN STEM W/O DYE: CPT

## 2021-02-06 PROCEDURE — NC001 PR NO CHARGE: Performed by: NURSE PRACTITIONER

## 2021-02-06 PROCEDURE — 99220 PR INITIAL OBSERVATION CARE/DAY 70 MINUTES: CPT | Performed by: FAMILY MEDICINE

## 2021-02-06 PROCEDURE — 36415 COLL VENOUS BLD VENIPUNCTURE: CPT | Performed by: PHYSICIAN ASSISTANT

## 2021-02-06 PROCEDURE — 84484 ASSAY OF TROPONIN QUANT: CPT | Performed by: PHYSICIAN ASSISTANT

## 2021-02-06 PROCEDURE — 81001 URINALYSIS AUTO W/SCOPE: CPT | Performed by: PHYSICIAN ASSISTANT

## 2021-02-06 PROCEDURE — 96361 HYDRATE IV INFUSION ADD-ON: CPT

## 2021-02-06 PROCEDURE — G1004 CDSM NDSC: HCPCS

## 2021-02-06 PROCEDURE — 93010 ELECTROCARDIOGRAM REPORT: CPT | Performed by: INTERNAL MEDICINE

## 2021-02-06 PROCEDURE — 70498 CT ANGIOGRAPHY NECK: CPT

## 2021-02-06 PROCEDURE — 80048 BASIC METABOLIC PNL TOTAL CA: CPT | Performed by: PHYSICIAN ASSISTANT

## 2021-02-06 PROCEDURE — 96360 HYDRATION IV INFUSION INIT: CPT

## 2021-02-06 PROCEDURE — 70496 CT ANGIOGRAPHY HEAD: CPT

## 2021-02-06 PROCEDURE — 93005 ELECTROCARDIOGRAM TRACING: CPT

## 2021-02-06 PROCEDURE — 85025 COMPLETE CBC W/AUTO DIFF WBC: CPT | Performed by: PHYSICIAN ASSISTANT

## 2021-02-06 PROCEDURE — 99284 EMERGENCY DEPT VISIT MOD MDM: CPT | Performed by: PHYSICIAN ASSISTANT

## 2021-02-06 PROCEDURE — 80076 HEPATIC FUNCTION PANEL: CPT | Performed by: PHYSICIAN ASSISTANT

## 2021-02-06 RX ORDER — ONDANSETRON 2 MG/ML
4 INJECTION INTRAMUSCULAR; INTRAVENOUS EVERY 6 HOURS PRN
Status: DISCONTINUED | OUTPATIENT
Start: 2021-02-06 | End: 2021-02-06 | Stop reason: HOSPADM

## 2021-02-06 RX ORDER — OXYBUTYNIN CHLORIDE 5 MG/1
15 TABLET, EXTENDED RELEASE ORAL DAILY
Status: DISCONTINUED | OUTPATIENT
Start: 2021-02-06 | End: 2021-02-06 | Stop reason: HOSPADM

## 2021-02-06 RX ORDER — MECLIZINE HCL 12.5 MG/1
12.5 TABLET ORAL EVERY 8 HOURS PRN
Qty: 15 TABLET | Refills: 0 | Status: SHIPPED | OUTPATIENT
Start: 2021-02-06

## 2021-02-06 RX ORDER — ATORVASTATIN CALCIUM 40 MG/1
40 TABLET, FILM COATED ORAL EVERY EVENING
Status: DISCONTINUED | OUTPATIENT
Start: 2021-02-06 | End: 2021-02-06 | Stop reason: HOSPADM

## 2021-02-06 RX ORDER — AMITRIPTYLINE HYDROCHLORIDE 100 MG/1
100 TABLET, FILM COATED ORAL
COMMUNITY

## 2021-02-06 RX ORDER — AMITRIPTYLINE HYDROCHLORIDE 100 MG/1
100 TABLET, FILM COATED ORAL
Status: DISCONTINUED | OUTPATIENT
Start: 2021-02-06 | End: 2021-02-06 | Stop reason: HOSPADM

## 2021-02-06 RX ORDER — TOPIRAMATE 100 MG/1
200 TABLET, FILM COATED ORAL 2 TIMES DAILY
Status: DISCONTINUED | OUTPATIENT
Start: 2021-02-06 | End: 2021-02-06 | Stop reason: HOSPADM

## 2021-02-06 RX ORDER — PANTOPRAZOLE SODIUM 40 MG/1
40 TABLET, DELAYED RELEASE ORAL DAILY
Status: DISCONTINUED | OUTPATIENT
Start: 2021-02-06 | End: 2021-02-06 | Stop reason: HOSPADM

## 2021-02-06 RX ORDER — MECLIZINE HYDROCHLORIDE 25 MG/1
25 TABLET ORAL ONCE
Status: COMPLETED | OUTPATIENT
Start: 2021-02-06 | End: 2021-02-06

## 2021-02-06 RX ORDER — ASPIRIN 81 MG/1
81 TABLET, CHEWABLE ORAL DAILY
Status: DISCONTINUED | OUTPATIENT
Start: 2021-02-06 | End: 2021-02-06 | Stop reason: HOSPADM

## 2021-02-06 RX ORDER — MECLIZINE HCL 12.5 MG/1
12.5 TABLET ORAL EVERY 8 HOURS PRN
Status: DISCONTINUED | OUTPATIENT
Start: 2021-02-06 | End: 2021-02-06 | Stop reason: HOSPADM

## 2021-02-06 RX ORDER — LORAZEPAM 1 MG/1
1 TABLET ORAL 2 TIMES DAILY PRN
Status: DISCONTINUED | OUTPATIENT
Start: 2021-02-06 | End: 2021-02-06 | Stop reason: HOSPADM

## 2021-02-06 RX ORDER — ACETAMINOPHEN 325 MG/1
650 TABLET ORAL EVERY 6 HOURS PRN
Status: DISCONTINUED | OUTPATIENT
Start: 2021-02-06 | End: 2021-02-06 | Stop reason: HOSPADM

## 2021-02-06 RX ADMIN — IOHEXOL 90 ML: 350 INJECTION, SOLUTION INTRAVENOUS at 01:40

## 2021-02-06 RX ADMIN — ENOXAPARIN SODIUM 40 MG: 40 INJECTION SUBCUTANEOUS at 15:19

## 2021-02-06 RX ADMIN — ASPIRIN 81 MG: 81 TABLET, CHEWABLE ORAL at 15:19

## 2021-02-06 RX ADMIN — OXYBUTYNIN 15 MG: 5 TABLET, FILM COATED, EXTENDED RELEASE ORAL at 15:19

## 2021-02-06 RX ADMIN — PANTOPRAZOLE SODIUM 40 MG: 40 TABLET, DELAYED RELEASE ORAL at 15:19

## 2021-02-06 RX ADMIN — SODIUM CHLORIDE 1000 ML: 0.9 INJECTION, SOLUTION INTRAVENOUS at 00:32

## 2021-02-06 RX ADMIN — MECLIZINE HYDROCHLORIDE 25 MG: 25 TABLET ORAL at 00:24

## 2021-02-06 NOTE — UTILIZATION REVIEW
Notification of Observation Admission/Observation Authorization Request   This is a Notification of Observation Admission for [address]  Be advised that this patient was admitted to our facility under Observation Status  Contact [facility] at [phone] for additional admission information  [address] [facility] DEPT DEDICATED Liana Wilson [phone]  Patient Name:   Colin Vasquez   YOB: 1955       [address]   [address]:   [address]   Tax ID: [id number]  NPI: [id number] Attending Provider/NPI:  Phone:  Address: Vineet Simms [[id number]]  [phone]  Same as the [address] of Service Code: 25     Place of Service Name:  CPT Code for Observation:  On [facility]  CPT  / CPT 05519   Start Date: 02/05/2021 Discharge Date & Time: No discharge date for patient encounter  Type of Admission: Observation Status Discharge Disposition   (if discharged): Home/Self Care   Patient Diagnoses: Dizziness [R42]     Orders: Admission Orders (From admission, onward)     Ordered        02/06/21 0237  Place in Observation  Once                    Assigned Utilization Review Contact: [facility]  Utilization   Network Utilization Review Department  Phone: [phone]; Fax [phone]  Email: [email or web address]   ATTENTION PAYERS: Please call the assigned Utilization  directly with any questions or concerns ALL voicemails in the department are confidential  Send all requests for admission clinical reviews, approved or denied determinations and any other requests to dedicated fax number belonging to the campus where the patient is receiving treatment

## 2021-02-06 NOTE — UTILIZATION REVIEW
Initial Clinical Review    Admission: Date/Time/Statement:   Admission Orders (From admission, onward)     Ordered        02/06/21 0237  Place in Observation  Once                   Orders Placed This Encounter   Procedures    Place in Observation     Standing Status:   Standing     Number of Occurrences:   1     Order Specific Question:   Level of Care     Answer:   Med Surg [16]     ED Arrival Information     Expected Arrival Acuity Means of Arrival Escorted By Service Admission Type    - 2/5/2021 23:31 Urgent Walk-In Self General Medicine Urgent    Arrival Complaint    dizziness        Chief Complaint   Patient presents with    Dizziness     Since 0530 this am  No other complaints  Pt states "Namrata [place] never felt like this before - I'm just so dizzy " No new medications or changes in diet     Assessment/Plan: 76 yo F with a pmh of migraine headache, insomnia, HLD, and GERD  She presented to the ED with dizziness that started this am   She reports the room spinning, which improved with sitting or lying down, worsening  with standing or any type of movement  She is admitted to observation status for dizziness  2/6 -  Dizziness with require further cardiac vrs neuro workup         ED Triage Vitals [02/05/21 2340]   Temperature Pulse Respirations Blood Pressure SpO2   98 8 °F (37 1 °C) 82 20 138/81 95 %      Temp Source Heart Rate Source Patient Position - Orthostatic VS BP Location FiO2 (%)   Oral Monitor Lying Right arm --      Pain Score       No Pain          Wt Readings from Last 1 Encounters:   02/06/21 67 1 kg (147 lb 14 9 oz)     Additional Vital Signs:   Date/Time  Temp  Pulse  Resp  BP  MAP (mmHg)  SpO2  O2 Device  Patient Position - Orthostatic VS   02/06/21 0900  --  69  18  146/71  102  95 %  --  --   02/06/21 0800  --  51Abnormal   21  152/71  102  97 %  --  --   02/06/21 0730  --  59  19  163/71  102  96 %  --  --   02/06/21 0700  --  54Abnormal   19  146/64  --  96 %  None (Room air)  Lying   02/06/21 0630  --  54Abnormal   20  154/63  91  97 %  None (Room air)  --   02/06/21 0600  --  48Abnormal   18  116/59  81  96 %  None (Room air)  --   02/06/21 0530  --  47Abnormal   17  125/59  85  95 %  None (Room air)  Lying   02/06/21 0500  --  46Abnormal   22  169/71  102  97 %  --  Lying   02/06/21 0400  --  62  21  162/72  103  98 %  None (Room air)  Lying   02/06/21 0330  --  70  20  149/74  106  98 %  --  Lying   02/06/21 0300  --  58  20  158/64  --  99 %  --  Lying   02/06/21 0200  --  61  20  147/71  102  98 %  None (Room air)  --   02/06/21 0100  --  48Abnormal   21  153/62  89  98 %  None (Room air)  Lying             Pertinent Labs/Diagnostic Test Results:       Results from last 7 days   Lab Units 02/06/21  0030   WBC Thousand/uL 5 89   HEMOGLOBIN g/dL 13 7   HEMATOCRIT % 42 2   PLATELETS Thousands/uL 229   NEUTROS ABS Thousands/µL 3 68         Results from last 7 days   Lab Units 02/06/21  0030   SODIUM mmol/L 142   POTASSIUM mmol/L 3 5   CHLORIDE mmol/L 108   CO2 mmol/L 25   ANION GAP mmol/L 9   BUN mg/dL 13   CREATININE mg/dL 0 96   EGFR ml/min/1 73sq m 61   CALCIUM mg/dL 9 0     Results from last 7 days   Lab Units 02/06/21  0030   AST U/L 10   ALT U/L 17   ALK PHOS U/L 79   TOTAL PROTEIN g/dL 7 5   ALBUMIN g/dL 3 9   TOTAL BILIRUBIN mg/dL 0 40   BILIRUBIN DIRECT mg/dL 0 09         Results from last 7 days   Lab Units 02/06/21  0030   GLUCOSE RANDOM mg/dL 103     Results from last 7 days   Lab Units 02/06/21  0030   TROPONIN I ng/mL <0 02       Results from last 7 days   Lab Units 02/06/21  0144   CLARITY UA  Clear   COLOR UA  Yellow   SPEC GRAV UA  <=1 005   PH UA  6 5   GLUCOSE UA mg/dl Negative   KETONES UA mg/dl Negative   BLOOD UA  Negative   PROTEIN UA mg/dl Negative   NITRITE UA  Negative   BILIRUBIN UA  Negative   UROBILINOGEN UA E U /dl 0 2   LEUKOCYTES UA  Small*   WBC UA /hpf 4-10*   RBC UA /hpf None Seen   BACTERIA UA /hpf Occasional   EPITHELIAL CELLS WET PREP /hpf Occasional     CTA Head & neck - 2/6 - No acute intracranial abnormality   Moderate to marked chronic diffuse ventriculomegaly  No focal stenosis or saccular aneurysm within the [place] of [place]  No hemodynamically significant stenosis within either common or internal carotid artery   Less than 50% stenosis by NASCET criteria  ED Treatment:   Medication Administration from 02/05/2021 2331 to 02/06/2021 9318       Date/Time Order Dose Route Action Comments     02/06/2021 0032 sodium chloride 0 9 % bolus 1,000 mL 1,000 mL Intravenous New Bag      02/06/2021 0024 meclizine (ANTIVERT) tablet 25 mg 25 mg Oral Given      02/06/2021 0140 iohexol (OMNIPAQUE) 350 MG/ML injection (MULTI-DOSE) 90 mL 90 mL Intravenous Given         Past Medical History:   Diagnosis Date    Arthritis     Hyperlipidemia     Migraines      Present on Admission:  **None**      Admitting Diagnosis: Dizziness [R42]  Age/Sex: 77 y o  female  Admission Orders:  Scheduled Medications:        No scheduled meds as of  2/6 9:48 am       Network Utilization Review Department  ATTENTION: Please call with any questions or concerns to [phone] and carefully listen to the prompts so that you are directed to the right person  All voicemails are confidential   Eugenio Los all requests for admission clinical reviews, approved or denied determinations and any other requests to dedicated fax number below belonging to the campus where the patient is receiving treatment   List of dedicated fax numbers for the Facilities:  [address] [place] [address] DENIALS (Administrative/Medical Necessity) [phone]   [address] [place] [address] (Maternity/NICU/Pediatrics) [address] [facility],[address] Helena Aguillon 0739 (Brigid Castellanos "Daisy" 103) [id number] [place] [address] [place] [address] Afshan Leigh 1481 [phone]   [place] [address] [phone]

## 2021-02-06 NOTE — ED PROVIDER NOTES
History  Chief Complaint   Patient presents with    Dizziness     Since 0530 this am  No other complaints  Pt states "Fred Black never felt like this before - I'm just so dizzy " No new medications or changes in diet     24-year-old female with past medical history significant for migraine headache, insomnia, hyperlipidemia, GERD who presents to the emergency department for complaint of dizziness noticed this morning upon waking at approximately 5:30AM   Patient denies experiencing this before  She describes the dizziness as room spinning, improved when sitting or lying down, worse when standing or with any kind of movement, did not take anything for symptoms  He denies any other complaining symptoms including LOC, headache, confusion, blurred or double vision, photosensitivity, neck pain or stiffness, nausea vomiting, facial or extremity numbness, increased weakness or fatigue  She denies any recent fall or head trauma  She denies any new medications or supplements  She does not consume alcohol or use illicit drugs  Prior to Admission Medications   Prescriptions Last Dose Informant Patient Reported? Taking?    LORazepam (ATIVAN) 1 mg tablet  Self Yes No   Sig: Take 1 mg by mouth 2 (two) times a day as needed   atorvastatin (LIPITOR) 20 mg tablet  Self Yes No   Sig: TAKE 1 TABLET BY MOUTH EVERY DAY AT NIGHT   diclofenac (VOLTAREN) 75 mg EC tablet  Self Yes No   Sig: Take 75 mg by mouth 2 (two) times a day   naproxen (NAPROSYN) 500 mg tablet  Self Yes No   Sig: as needed   oxybutynin (DITROPAN XL) 15 MG 24 hr tablet  Self Yes No   Sig: Take 15 mg by mouth daily   pantoprazole (PROTONIX) 40 mg tablet   No No   Sig: TAKE 1 TABLET BY MOUTH EVERY DAY   pantoprazole (PROTONIX) 40 mg tablet   No No   Sig: Take 1 tablet (40 mg total) by mouth 2 (two) times a day   patient supplied medication  Self Yes No   topiramate (TOPAMAX) 200 MG tablet  Self Yes No   Sig: Take 200 mg by mouth 2 (two) times a day Facility-Administered Medications: None       Past Medical History:   Diagnosis Date    Arthritis     Hyperlipidemia     Migraines        Past Surgical History:   Procedure Laterality Date     SECTION      two     HYSTERECTOMY      JOINT REPLACEMENT Right     REPLACEMENT TOTAL KNEE      SHOULDER ARTHROSCOPY Right        Family History   Problem Relation Age of Onset    Diabetes Father      I have reviewed and agree with the history as documented  E-Cigarette/Vaping    E-Cigarette Use Never User      E-Cigarette/Vaping Substances    Nicotine No     THC No     CBD No     Flavoring No     Other No     Unknown No      Social History     Tobacco Use    Smoking status: Never Smoker    Smokeless tobacco: Never Used   Substance Use Topics    Alcohol use: Not Currently    Drug use: Never       Review of Systems   Gastrointestinal: Negative for nausea and vomiting  Genitourinary: Negative for decreased urine volume, difficulty urinating, dysuria, flank pain, frequency, hematuria and urgency  Musculoskeletal: Negative for back pain, neck pain and neck stiffness  Skin: Negative for color change and rash  Neurological: Positive for dizziness and light-headedness  Negative for tremors, seizures, syncope, facial asymmetry, speech difficulty, weakness, numbness and headaches  Hematological: Negative for adenopathy  Physical Exam  Physical Exam  Vitals signs reviewed  Constitutional:       General: She is awake  She is not in acute distress  Appearance: Normal appearance  She is well-developed  She is not toxic-appearing  HENT:      Head: Normocephalic and atraumatic  Mouth/Throat:      Lips: Pink  Mouth: Mucous membranes are moist       Pharynx: Oropharynx is clear  Uvula midline  Eyes:      Extraocular Movements: Extraocular movements intact  Conjunctiva/sclera: Conjunctivae normal       Pupils: Pupils are equal, round, and reactive to light     Neck: Musculoskeletal: Normal range of motion and neck supple  Cardiovascular:      Rate and Rhythm: Normal rate and regular rhythm  Pulses: Normal pulses  Pulmonary:      Effort: Pulmonary effort is normal       Breath sounds: Normal breath sounds  Musculoskeletal: Normal range of motion  Skin:     General: Skin is warm  Capillary Refill: Capillary refill takes less than 2 seconds  Findings: No erythema, lesion or rash  Neurological:      Mental Status: She is alert and oriented to person, place, and time  GCS: GCS eye subscore is 4  GCS verbal subscore is 5  GCS motor subscore is 6  Cranial Nerves: Cranial nerves are intact  Sensory: Sensation is intact  Motor: Motor function is intact  Coordination: Coordination is intact  Gait: Gait abnormal (unsteady, wobbling)           Vital Signs  ED Triage Vitals [02/05/21 2340]   Temperature Pulse Respirations Blood Pressure SpO2   98 8 °F (37 1 °C) 82 20 138/81 95 %      Temp Source Heart Rate Source Patient Position - Orthostatic VS BP Location FiO2 (%)   Oral Monitor Lying Right arm --      Pain Score       No Pain           Vitals:    02/06/21 0200 02/06/21 0300 02/06/21 0330 02/06/21 0400   BP: 147/71 158/64 149/74 162/72   Pulse: 61 58 70 62   Patient Position - Orthostatic VS:  Lying Lying Lying         Visual Acuity  Visual Acuity      Most Recent Value   L Pupil Size (mm)  3   R Pupil Size (mm)  3   L Pupil Shape  Round   R Pupil Shape  Round          ED Medications  Medications   sodium chloride 0 9 % bolus 1,000 mL (1,000 mL Intravenous New Bag 2/6/21 0032)   meclizine (ANTIVERT) tablet 25 mg (25 mg Oral Given 2/6/21 0024)   iohexol (OMNIPAQUE) 350 MG/ML injection (MULTI-DOSE) 90 mL (90 mL Intravenous Given 2/6/21 0140)       Diagnostic Studies  Results Reviewed     Procedure Component Value Units Date/Time    Urine Microscopic [[id number]]  (Abnormal) Collected: 02/06/21 0144    Lab Status: Final result Specimen: Urine, Other Updated: 02/06/21 0157     RBC, UA None Seen /hpf      WBC, UA 4-10 /hpf      Epithelial Cells Occasional /hpf      Bacteria, UA Occasional /hpf     UA w Reflex to Microscopic w Reflex to Culture [[id number]]  (Abnormal) Collected: 02/06/21 0144    Lab Status: Final result Specimen: Urine, Other Updated: 02/06/21 0151     Color, UA Yellow     Clarity, UA Clear     Specific Gravity, UA <=1 005     pH, UA 6 5     Leukocytes, UA Small     Nitrite, UA Negative     Protein, UA Negative mg/dl      Glucose, UA Negative mg/dl      Ketones, UA Negative mg/dl      Urobilinogen, UA 0 2 E U /dl      Bilirubin, UA Negative     Blood, UA Negative    Troponin I [[id number]]  (Normal) Collected: 02/06/21 0030    Lab Status: Final result Specimen: Blood from Arm, Left Updated: 02/06/21 0056     Troponin I <0 02 ng/mL     Basic metabolic panel [[id number]] Collected: 02/06/21 0030    Lab Status: Final result Specimen: Blood from Arm, Left Updated: 02/06/21 0054     Sodium 142 mmol/L      Potassium 3 5 mmol/L      Chloride 108 mmol/L      CO2 25 mmol/L      ANION GAP 9 mmol/L      BUN 13 mg/dL      Creatinine 0 96 mg/dL      Glucose 103 mg/dL      Calcium 9 0 mg/dL      eGFR 61 ml/min/1 73sq m     Narrative:      Juan F guidelines for Chronic Kidney Disease (CKD):     Stage 1 with normal or high GFR (GFR > 90 mL/min/1 73 square meters)    Stage 2 Mild CKD (GFR = 60-89 mL/min/1 73 square meters)    Stage 3A Moderate CKD (GFR = 45-59 mL/min/1 73 square meters)    Stage 3B Moderate CKD (GFR = 30-44 mL/min/1 73 square meters)    Stage 4 Severe CKD (GFR = 15-29 mL/min/1 73 square meters)    Stage 5 End Stage CKD (GFR <15 mL/min/1 73 square meters)  Note: GFR calculation is accurate only with a steady state creatinine    Hepatic function panel [[id number]]  (Normal) Collected: 02/06/21 0030    Lab Status: Final result Specimen: Blood from Arm, Left Updated: 02/06/21 0054     Total Bilirubin 0 40 mg/dL      Bilirubin, Direct 0 09 mg/dL      Alkaline Phosphatase 79 U/L      AST 10 U/L      ALT 17 U/L      Total Protein 7 5 g/dL      Albumin 3 9 g/dL     CBC and differential [[id number]] Collected: 02/06/21 0030    Lab Status: Final result Specimen: Blood from Arm, Left Updated: 02/06/21 0039     WBC 5 89 Thousand/uL      RBC 4 69 Million/uL      Hemoglobin 13 7 g/dL      Hematocrit 42 2 %      MCV 90 fL      MCH 29 2 pg      MCHC 32 5 g/dL      RDW 12 7 %      MPV 10 3 fL      Platelets 964 Thousands/uL      nRBC 0 /100 WBCs      Neutrophils Relative 62 %      Immat GRANS % 1 %      Lymphocytes Relative 27 %      Monocytes Relative 8 %      Eosinophils Relative 1 %      Basophils Relative 1 %      Neutrophils Absolute 3 68 Thousands/µL      Immature Grans Absolute 0 03 Thousand/uL      Lymphocytes Absolute 1 61 Thousands/µL      Monocytes Absolute 0 48 Thousand/µL      Eosinophils Absolute 0 06 Thousand/µL      Basophils Absolute 0 03 Thousands/µL                  CTA head and neck with and without contrast   Final Result by Yovani Moody MD (02/06 9644)      No acute intracranial abnormality  Moderate to marked chronic diffuse ventriculomegaly  No focal stenosis or saccular aneurysm within the [place] of [place]  No hemodynamically significant stenosis within either common or internal carotid artery  Less than 50% stenosis by NASCET criteria  Workstation performed: [id number]                    Procedures  Procedures         ED Course  ED Course as of Feb 06 0435   Sat Feb 06, 2021   4194 On reassessment, patient remains persistently dizzy, with minimal improvement after meds  She is unsteady with ambulation  Workup is generally unremarkable  CT showing evidence of chronic ventriculomegaly, seen on previous study    Will admit for stroke pathway                  Identification of Seniors at Risk      Most Recent Value   (ISAR) Identification of Seniors at Risk   Before the illness or injury that brought you to the Emergency, did you need someone to help you on a regular basis? 0 Filed at: 02/05/2021 2339   In the last 24 hours, have you needed more help than usual?  0 Filed at: 02/05/2021 2339   Have you been hospitalized for one or more nights during the past 6 months? 0 Filed at: 02/05/2021 2339   In general, do you see well?  0 Filed at: 02/05/2021 2339   In general, do you have serious problems with your memory? 0 Filed at: 02/05/2021 2339   Do you take more than three different medications every day? 1 Filed at: 02/05/2021 2339   ISAR Score  1 Filed at: 02/05/2021 2339                    SBIRT 22yo+      Most Recent Value   SBIRT (25 yo +)   In order to provide better care to our patients, we are screening all of our patients for alcohol and drug use  Would it be okay to ask you these screening questions? Yes Filed at: 02/06/2021 0000   Initial Alcohol Screen: US AUDIT-C    1  How often do you have a drink containing alcohol?  0 Filed at: 02/06/2021 0000   2  How many drinks containing alcohol do you have on a typical day you are drinking? 0 Filed at: 02/06/2021 0000   3a  Male UNDER 65: How often do you have five or more drinks on one occasion? 0 Filed at: 02/06/2021 0000   3b  FEMALE Any Age, or MALE 65+: How often do you have 4 or more drinks on one occassion? 0 Filed at: 02/06/2021 0000   Audit-C Score  0 Filed at: 02/06/2021 0000   PAYTON: How many times in the past year have you    Used an illegal drug or used a prescription medication for non-medical reasons? Never Filed at: 02/06/2021 0000                    MDM  Number of Diagnoses or Management Options  Dizziness:   Diagnosis management comments: On exam, well-appearing female, no acute distress, nontoxic appearance, vitals unremarkable, awake alert and oriented, normal neuro exam without focal deficits, EOMs intact, PERRLA, remainder of exam unremarkable      Consider the following potential etiologies:  TIA/CVA, cerebral thrombosis, sinus thrombosis, carotid stenosis, vertiginous syndrome (peripheral versus central), acute viral syndrome, dehydration, SAUL, electrolyte abnormality, arrhythmia or heart block, intracranial mass, hydrocephalus, orthostatic hypertension, toxins (seems unlikely given history)       Amount and/or Complexity of Data Reviewed  Clinical lab tests: reviewed and ordered  Tests in the radiology section of CPT®: ordered and reviewed  Tests in the medicine section of CPT®: reviewed and ordered  Discussion of test results with the performing providers: yes  Decide to obtain previous medical records or to obtain history from someone other than the patient: yes  Obtain history from someone other than the patient: yes  Review and summarize past medical records: yes  Discuss the patient with other providers: yes  Independent visualization of images, tracings, or specimens: yes    Patient Progress  Patient progress: stable (See ED course note for dispo and plan  I reviewed and discussed all lab and imaging findings with the patient at bedside  I answered any and all questions the patient had regarding emergency department course of evaluation and treatment  The patient verbalized understanding of and agreement with plan   )      Disposition  Final diagnoses:   Dizziness     Time reflects when diagnosis was documented in both MDM as applicable and the Disposition within this note     Time User Action Codes Description Comment    2/6/2021  2:36 AM Shazia Rivas Add [R42] Dizziness       ED Disposition     ED Disposition Condition Date/Time Comment    Admit Stable Sat Feb 6, 2021  2:36 AM Case was discussed with Jeannie Lane and the patient's admission status was agreed to be Admission Status: observation status to the service of Dr Huynh Stage           Follow-up Information    None         Patient's Medications   Discharge Prescriptions    No medications on file     No discharge procedures on file     PDMP Review     None          ED Provider  Electronically Signed by           Pedro Carrero PA-C  02/07/21 4256
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
## 2021-02-06 NOTE — ASSESSMENT & PLAN NOTE
· Onset upon awakening yesterday 2/5  · CTA unremarkable for carotid stenosis or other acute finding  · Obtain EKG  · Orthostatic blood pressure negative  · MRI negative for acute CVA ventral megaly present which was present on in 2019Mild inferior vermian hypoplasiaConsider nonemergent and routine neurosurgery evaluation  · Add p r n   Meclizine  · Patient currently asymptomatic
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
## 2021-02-06 NOTE — ASSESSMENT & PLAN NOTE
· Onset upon awakening yesterday 2/5  · CTA unremarkable for carotid stenosis or other acute finding  · Obtain EKG  · Check orthostatic blood pressure  · Continue telemetry monitoring  · Obtain MRI of brain for completeness  · Differential to include cardiac versus neuro versus vertigo  · Add p r n   Meclizine  · Patient currently asymptomatic

## 2021-02-06 NOTE — ED NOTES
Pt provided menu to order lunch       Tyrone Keith RN  02/06/21 [facility] Pennie Serrano, VINAYAK  02/06/21 3144

## 2021-02-06 NOTE — ED NOTES
Pt ambulated to bathroom with an assist x1  Pt reported she did not feel dizzy & lost her balance only briefly walking back to room  Pt was also provided with oral care supplies  This writer stayed with pt while she brushed her teeth at room sink & assisted her back to stretcher  Pt has call bell at bedside        Michael Queen  02/06/21 1025

## 2024-02-21 PROBLEM — Z12.11 COLON CANCER SCREENING: Status: RESOLVED | Noted: 2019-08-23 | Resolved: 2024-02-21

## 2024-02-27 ENCOUNTER — EVALUATION (OUTPATIENT)
Dept: OCCUPATIONAL THERAPY | Facility: CLINIC | Age: 69
End: 2024-02-27
Payer: MEDICARE

## 2024-02-27 DIAGNOSIS — M20.032 SWAN-NECK DEFORMITY OF FINGER OF BOTH HANDS: Primary | ICD-10-CM

## 2024-02-27 DIAGNOSIS — M20.031 SWAN-NECK DEFORMITY OF FINGER OF BOTH HANDS: Primary | ICD-10-CM

## 2024-02-27 PROCEDURE — 97165 OT EVAL LOW COMPLEX 30 MIN: CPT | Performed by: OCCUPATIONAL THERAPIST

## 2024-02-27 PROCEDURE — 97110 THERAPEUTIC EXERCISES: CPT | Performed by: OCCUPATIONAL THERAPIST

## 2024-02-27 NOTE — PROGRESS NOTES
OT Evaluation     Today's date: 2024  Patient name: Lali Barrios  : 1955  MRN: [id number]  Referring provider: James Sarah MD  Dx:   Encounter Diagnosis     ICD-10-CM    1. [place]-neck deformity of finger of both hands  M20.031     M20.032                      Assessment  Assessment details: Lali is a 67 y/o RHD female presenting with chronic bilateral index swan neck deformities.  Her left index is more advanced than the right.  She demonstrates a full fist in both hands.  In attempting to extend the index finger of the left, she postures the MP joint in hyperextension.  Also, in gripping items, she will maintain the index finger in full extension which increases the swan tendency.  This deformity has progressed over the past year.  Pain is greatest in the DIP joints with left more than right.  She demonstrates excellent digital flexion and functional strength bilaterally.  She is currently retired. Evaluation is of low complexity, due to minimal comorbidities and stable clinical presentation.      Pt demonstrates good tolerance of therapy today and was provided focusing on day and night splinting for correction and prevention of progression of swan neck.  The patient verbalizes understanding and is in agreement with the written HEP.  She will benefit from skilled OT intervention to reduce pain and progression of swan neck deformities and allow return to PLOF.           Impairments: abnormal or restricted ROM, activity intolerance, impaired physical strength, lacks appropriate home exercise program and pain with function    Symptom irritability: lowUnderstanding of Dx/Px/POC: excellent  Goals  STG 2 weeks   Increase digital arc by at least 10 degrees in extension and flexion   Increase flexion to DPC by at least 1 cm   Reduce edema to a minimal level   Reduce pain at rest to less than 2/10    LTG  By discharge   Achieve functional ORIELLY of digit >200 to allow independence in holding small items  in hand   Achieve functional extension with minimal lag to allow independence in donning gloves and tucking in clothing.   Pain at rest resolved, pain less than 2/10 with activity achieving independence in self care without increased of pain.    Achieve  strength to at least 50% of the uninvolved achieving independence in opening containers.   Achieve FOTO goals established at IE.          Plan  Patient would benefit from: skilled occupational therapy  Planned modality interventions: thermotherapy: hydrocollator packs  Planned therapy interventions: IASTM, joint mobilization, kinesiology taping, manual therapy, massage, activity modification, patient education, strengthening, stretching, therapeutic activities, therapeutic exercise, home exercise program, graded exercise, graded activity, functional ROM exercises, fine motor coordination training and orthotic fitting/training  Plan of Care beginning date: 2024  Plan of Care expiration date: 2024  Treatment plan discussed with: patient    Subjective Evaluation    History of Present Illness  Mechanism of injury: Progression of swan neck index fingers.            Recurrent probem    Quality of life: good    Patient Goals  Patient goals for therapy: increased motion, increased strength, decreased pain and independence with ADLs/IADLs    Pain  Current pain ratin  Quality: discomfort  Relieving factors: rest  Exacerbated by: gripping with force.  Progression: worsening    Social Support    Employment status: not working  Hand dominance: right    Treatments  No previous or current treatments      Objective     Observations     Left Wrist/Hand   Positive for swan neck deformity.     Right Wrist/Hand   Positive for swan neck deformity.     Additional Observation Details  Left swan presents with fixed DIP flexion     Right swan, soft mallet presentation.     Tenderness     Left Wrist/Hand   Tenderness in the DIP joint and PIP joint.     Right Wrist/Hand    Tenderness in the PIP joint.     Neurological Testing     Sensation     Wrist/Hand   Left   Intact: light touch    Right   Intact: light touch    Active Range of Motion     Left Digits    Flexion   Index     DIP: 35    Right Digits   Flexion   Index     DIP: 12    Additional Active Range of Motion Details  Left mild PIP  hyperextension present.  Right not present.      Strength/Myotome Testing     Additional Strength Details  Edwin #2  L  25.7 lbs,  R  41.2 lbs.            Precautions:  universal    POC expires Unit limit Auth Expiration date PT/OT/ST + Visit Limit?   4/9 4 No auth BOMN         Dx Osbaldo II swan     Dr Sarah     FOZEENAT @ IE         CP 0;  CI 20    Date 2/27            Visit 1            Manuals                                                                 Neuro Re-Ed                                                                                                        Ther Ex             HEP Oval 8 L II Day; night flexion II  L, night compress R II DIP                                                                                                       Ther Activity                                       Gait Training                                       Modalities

## 2024-02-27 NOTE — LETTER
2024    James Sarah MD  [address] [facility] [address]    Patient: Lali Barrios   YOB: 1955   Date of Visit: 2024     Encounter Diagnosis     ICD-10-CM    1. [place]-neck deformity of finger of both hands  M20.031     M20.032           Dear Dr. Sarah:    Thank you for your recent referral of Lali Barrios. Please review the attached evaluation summary from Lali's recent visit.     Please verify that you agree with the plan of care by signing the attached order.     If you have any questions or concerns, please do not hesitate to call.     I sincerely appreciate the opportunity to share in the care of one of your patients and hope to have another opportunity to work with you in the near future.     Sincerely,    Pooja Polk, OT      Referring Provider:     I certify that I have read the below Plan of Care and certify the need for these services furnished under this plan of treatment while under my care.                    James Sarah MD  [address] [facility] [address]  Via Fax: [phone]        OT Evaluation     Today's date: 2024  Patient name: Lali Barrios  : 1955  MRN: [id number]  Referring provider: James Sarah MD  Dx:   Encounter Diagnosis     ICD-10-CM    1. [place]-neck deformity of finger of both hands  M20.031     M20.032                      Assessment  Assessment details: Lali is a 67 y/o RHD female presenting with chronic bilateral index swan neck deformities.  Her left index is more advanced than the right.  She demonstrates a full fist in both hands.  In attempting to extend the index finger of the left, she postures the MP joint in hyperextension.  Also, in gripping items, she will maintain the index finger in full extension which increases the swan tendency.  This deformity has progressed over the past year.  Pain is greatest in the DIP joints with left more than right.  She demonstrates  excellent digital flexion and functional strength bilaterally.  She is currently retired. Evaluation is of low complexity, due to minimal comorbidities and stable clinical presentation.      Pt demonstrates good tolerance of therapy today and was provided focusing on day and night splinting for correction and prevention of progression of swan neck.  The patient verbalizes understanding and is in agreement with the written HEP.  She will benefit from skilled OT intervention to reduce pain and progression of swan neck deformities and allow return to PLOF.           Impairments: abnormal or restricted ROM, activity intolerance, impaired physical strength, lacks appropriate home exercise program and pain with function    Symptom irritability: lowUnderstanding of Dx/Px/POC: excellent  Goals  STG 2 weeks   Increase digital arc by at least 10 degrees in extension and flexion   Increase flexion to DPC by at least 1 cm   Reduce edema to a minimal level   Reduce pain at rest to less than 2/10    LTG  By discharge   Achieve functional OREILLY of digit >200 to allow independence in holding small items in hand   Achieve functional extension with minimal lag to allow independence in donning gloves and tucking in clothing.   Pain at rest resolved, pain less than 2/10 with activity achieving independence in self care without increased of pain.    Achieve  strength to at least 50% of the uninvolved achieving independence in opening containers.   Achieve FOTO goals established at .          Plan  Patient would benefit from: skilled occupational therapy  Planned modality interventions: thermotherapy: hydrocollator packs  Planned therapy interventions: IASTM, joint mobilization, kinesiology taping, manual therapy, massage, activity modification, patient education, strengthening, stretching, therapeutic activities, therapeutic exercise, home exercise program, graded exercise, graded activity, functional ROM exercises, fine motor  coordination training and orthotic fitting/training  Plan of Care beginning date: 2024  Plan of Care expiration date: 2024  Treatment plan discussed with: patient    Subjective Evaluation    History of Present Illness  Mechanism of injury: Progression of swan neck index fingers.            Recurrent probem    Quality of life: good    Patient Goals  Patient goals for therapy: increased motion, increased strength, decreased pain and independence with ADLs/IADLs    Pain  Current pain ratin  Quality: discomfort  Relieving factors: rest  Exacerbated by: gripping with force.  Progression: worsening    Social Support    Employment status: not working  Hand dominance: right    Treatments  No previous or current treatments      Objective     Observations     Left Wrist/Hand   Positive for swan neck deformity.     Right Wrist/Hand   Positive for swan neck deformity.     Additional Observation Details  Left swan presents with fixed DIP flexion     Right swan, soft mallet presentation.     Tenderness     Left Wrist/Hand   Tenderness in the DIP joint and PIP joint.     Right Wrist/Hand   Tenderness in the PIP joint.     Neurological Testing     Sensation     Wrist/Hand   Left   Intact: light touch    Right   Intact: light touch    Active Range of Motion     Left Digits    Flexion   Index     DIP: 35    Right Digits   Flexion   Index     DIP: 12    Additional Active Range of Motion Details  Left mild PIP  hyperextension present.  Right not present.      Strength/Myotome Testing     Additional Strength Details  Edwin #2  L  25.7 lbs,  R  41.2 lbs.            Precautions:  universal    POC expires Unit limit Auth Expiration date PT/OT/ST + Visit Limit?    4 No auth BOMN         Dx Osbaldo II swan     Dr Keara MCLAIN @ [facility]         CP 0;  CI 20    Date             Visit 1            Manuals                                                                 Neuro Re-Ed                                                                                                         Ther Ex             HEP Oval 8 L II Day; night flexion II  L, night compress R II DIP                                                                                                       Ther Activity                                       Gait Training                                       Modalities

## 2024-03-06 ENCOUNTER — OFFICE VISIT (OUTPATIENT)
Dept: OCCUPATIONAL THERAPY | Facility: CLINIC | Age: 69
End: 2024-03-06
Payer: MEDICARE

## 2024-03-06 DIAGNOSIS — M20.031 SWAN-NECK DEFORMITY OF FINGER OF BOTH HANDS: Primary | ICD-10-CM

## 2024-03-06 DIAGNOSIS — M20.032 SWAN-NECK DEFORMITY OF FINGER OF BOTH HANDS: Primary | ICD-10-CM

## 2024-03-06 PROCEDURE — 97110 THERAPEUTIC EXERCISES: CPT | Performed by: OCCUPATIONAL THERAPIST

## 2024-03-06 NOTE — PROGRESS NOTES
"Daily Note     Today's date: 3/7/2024  Patient name: Lali Barrios  : 1955  MRN: [id number]  Referring provider: James Sarah MD  Dx:   Encounter Diagnosis     ICD-10-CM    1. [place]-neck deformity of finger of both hands  M20.031     M20.032           Start Time: 1310  Stop Time: 1340  Total time in clinic (min): 30 minutes    Subjective:   \"the splints don't fit very well\" oval 8      Objective: See treatment diary below;  issued rere straps and dali'd night digital splint.       Assessment: Tolerated treatment well.  Wear of rere strap controls extensor habitus.        Plan: Continue per plan of care.  Progress treatment as tolerated.    Strongly encourage discontinuation of left index MP hyperextension posturing.       Precautions:  universal    POC expires Unit limit Auth Expiration date PT/OT/ST + Visit Limit?    4 No auth BOMN         Dx Osbaldo II swan     Dr Sarah     FOTO @ [facility]         CP 0;  CI 20    Date  3           Visit 1 2           Manuals                                                                 Neuro Re-Ed                                                                                                        Ther Ex    25           HEP Oval 8 L II Day; night flexion II  L, night compress R II DIP Night flexion gutter fabd; pt ed- rere strapping   Oval 8 wear           PROM 1:1  DIP E with PIP F LLPS                                                                                         Ther Activity                                       Gait Training                                       Modalities                                            "

## 2025-05-29 NOTE — PROGRESS NOTES
PT Evaluation     Today's date: 2025  Patient name: Lali Barrios  : 1955  MRN: [id number]  Referring provider: Chris Darby MD  Dx:   Encounter Diagnosis     ICD-10-CM    1. Presence of right artificial hip joint  Z96.641       2. Impaired functional mobility, balance, gait, and endurance  Z74.09                      Assessment  Impairments: abnormal gait, abnormal or restricted ROM, activity intolerance, impaired balance, impaired physical strength, lacks appropriate home exercise program, pain with function, weight-bearing intolerance and activity limitations  Other impairment: dereased flexibility    Assessment details: The patient is a 70 y/o female who presents to OPPT s/p R THR (posterior approach) on 25 by Dr. Darby.  She has complaints of constant pain since surgery.  Pain is typically in her hip and into her upper thigh, she denies any N&T into RLE.  She demonstrates deficits with decreased ROM and strength, decreased flexibility, decreased balance and proprioception and TTP.  These deficits lead to antalgic gait with intermittent use of AD, difficulty with stair negotiation, limited standing tolerance, difficulty sleeping and decreased tolerance to functional activities.  She ambulates intermittently without AD in the house but does use SPC for community distances.  Slow yulissa noted along with short step and stride length and decreased weight shift to RLE in stance phase.  She has difficulty with stair negotiation and has been going up and down the steps with non-reciprocal gait pattern.  Difficulty sleeping also noted, pain can wake her up overnight or she has a hard time finding a comfortable position to sleep.  She is currently off work.  Secondary to surgery and above deficits she is limited with her overall mobility and function.  The patient would benefit from continued PT to address deficits and improve function.  Tx to include ROM, stretching, strengthening,  modalities, HEP, pt education, postural ed, lifting/body mechanics, neuro re-ed, balance/proprioception Te, MT and equipment.      Understanding of Dx/Px/POC: good     Prognosis: good    Goals  STGs:  1.  Initiate and complete HEP with verbal cues.  2.  Improve R hip ROM by 5-10 degrees in 4 weeks.  3.  Improve R LE strength by 1/2-1 grade in 4 weeks.  4.  Decrease R hip pain by > 25% in 4 weeks.  LTGs:  1.  Patient to be I with HEP in 12 weeks.  2.  Improve R Hip ROM to WNL t/o in 12 weeks to improve function.  3.  Improve R LE strength to > or = to 4 to 4+/5 t/o in 12 weeks to improve function.  4.  Decrease R hip pain to < or = to 1-2/10 with activity in 12 weeks to improve function.   5.  Patient to ambulate with normalized gait pattern without AD in 12 weeks.  6.  Stair negotiation is improved to PLOF in 12 weeks.  7.  Recreational performance is improved to PLOF in 12 weeks.  8.  ADL performance is improved to PLOF in 12 weeks.  9.  Work performance is improved to maximal level of function in 12 weeks.   10.  FOTO score to meet or exceed expected outcomes in 12 weeks.          Plan  Patient would benefit from: skilled physical therapy  Planned modality interventions: cryotherapy    Planned therapy interventions: manual therapy, balance, balance/weight bearing training, neuromuscular re-education, patient/caregiver education, postural training, self care, strengthening, stretching, therapeutic activities, therapeutic exercise, flexibility, functional ROM exercises, gait training and home exercise program    Frequency: 2x week  Duration in weeks: 12  Plan of Care beginning date: 5/30/2025  Plan of Care expiration date: 8/22/2025  Treatment plan discussed with: patient  Plan details: Modalities and therapy interventions prn.          Subjective Evaluation    History of Present Illness  Mechanism of injury: The patient reports h/o R hip pain and arthritis.  She had tried injections.  She did have a fall in  "February in which she had increased pain in her hip after this fall.  She elected to have R THR.        She is s/p R THR - posterior approach - on 25 by Dr. Darby.  She had surgery at [facility].  She was kept 3 nights in the hospital with discharge to home.  She did have course of HHPT.  She was discharged from home care a week ago.  She did have one post-op appointment with the PA, per patient they are happy with how she is doing so far.    She was referred to OPPT and she now presents for her evaluation.  She will be going back to see the doctor in July for her follow up appointment.      The patient states that she has been having pain in her hip and along her incision.  Her pain has been constant.  She does note difficulty with dressing, especially with her shoes and socks.   She denies any N&T into RLE.    Patient Goals  Patient goals for therapy: increased motion, improved balance, decreased pain, increased strength, return to sport/leisure activities, independence with ADLs/IADLs and return to work  Patient goal: \"To get into my car easier, to do the steps normally, to get back to work and my normal life.\"  Pain  At best pain rating: 3  At worst pain ratin  Location: R Hip - hip into her thigh  Quality: discomfort and dull ache  Relieving factors: rest and ice  Aggravating factors: walking (Rolling over in bed)    Social Support  Steps to enter house: yes  2  Stairs in house: yes   Lives in: multiple-level home  Lives with: spouse ( is disabled)    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     Additional Observation Details  Incision present along R hip - healed.  No active bleeding or drainage noted, no redness or warmth.  Fair scar mobility noted.      Tenderness     Additional Tenderness Details  TTP noted around surgical incision.      Neurological Testing     Sensation     Hip   Left Hip "   Intact: light touch    Right Hip   Intact: light touch    Active Range of Motion   Left Hip   Flexion: 90 degrees   Abduction: 35 degrees     Right Hip   Flexion: 70 degrees with pain  Abduction: 18 degrees with pain    Additional Active Range of Motion Details  L SLR: 50  R SLR: 40    Strength/Myotome Testing     Left Hip   Planes of Motion   Flexion: WFL  Abduction: WFL  Adduction: WFL  External rotation: WFL  Internal rotation: WFL    Right Hip   Planes of Motion   Flexion: 3  Abduction: 3-  Adduction: 4-  External rotation: 3+  Internal rotation: 3+    Ambulation   Weight-Bearing Status   Assistive device used: single point cane    Additional Weight-Bearing Status Details  Walking intermittently without AD in the house.  Using SPC for outdoor ambulation.    Ambulation: Stairs   Ascend stairs: independent  Pattern: non-reciprocal  Descend stairs: independent  Pattern: non-reciprocal    Additional Stairs Ambulation Details  Has been doing them with non-reciprocal gait pattern even prior to surgery 2* h/o R TKR.      Observational Gait   Gait: antalgic   Decreased walking speed, stride length and left stance time.                 POC expires Unit limit Auth Expiration date PT/OT/ST + Visit Limit?   8/22/25 [facility]-BOMN N/A BOMN                           Visit/Unit Tracking  AUTH Status:  Date               N/A Used                Remaining                      Precautions: R THR - DOS: 4/28/25 - follow total hip precautions  PMH: R TKR, Arthritis, Anxiety, chronic pain, HTN, R shoulder surgery     Manuals 5/30       R Hip                                 Neuro Re-Ed         SLS        Tandem Stance        Sidestepping                                        Ther Ex        Bike        HR/TR HR x 10       Stand SLR Abd & Ext 10x ea       Marches 10x       Minisquats 10       TKE w/TBand        LAQ        QSets        SAQ        SLR        Bridges        Heel Slides                        Ther Activity        STS         Stepups F/L        Stepdowns        Gait Training                        Modalities

## 2025-05-30 ENCOUNTER — EVALUATION (OUTPATIENT)
Dept: PHYSICAL THERAPY | Facility: CLINIC | Age: 70
End: 2025-05-30
Payer: MEDICARE

## 2025-05-30 DIAGNOSIS — Z96.641 PRESENCE OF RIGHT ARTIFICIAL HIP JOINT: Primary | ICD-10-CM

## 2025-05-30 DIAGNOSIS — Z74.09 IMPAIRED FUNCTIONAL MOBILITY, BALANCE, GAIT, AND ENDURANCE: ICD-10-CM

## 2025-05-30 PROCEDURE — 97110 THERAPEUTIC EXERCISES: CPT | Performed by: PHYSICAL THERAPIST

## 2025-05-30 PROCEDURE — 97161 PT EVAL LOW COMPLEX 20 MIN: CPT | Performed by: PHYSICAL THERAPIST

## 2025-05-30 NOTE — LETTER
2025    Chris Darby MD  [address] [facility] [address] [facility], [facility] [address]    Patient: Lali Barrios   YOB: 1955   Date of Visit: 2025     Encounter Diagnosis     ICD-10-CM    1. Presence of right artificial hip joint  Z96.641       2. Impaired functional mobility, balance, gait, and endurance  Z74.09           Dear Dr. Chris Darby MD:    Thank you for your recent referral of Lali Barrios. Please review the attached evaluation summary from Lali's recent visit.     Please verify that you agree with the plan of care by signing the attached order.     If you have any questions or concerns, please do not hesitate to call.     I sincerely appreciate the opportunity to share in the care of one of your patients and hope to have another opportunity to work with you in the near future.       Sincerely,    Jayne Crabtree, PT      Referring Provider:      I certify that I have read the below Plan of Care and certify the need for these services furnished under this plan of treatment while under my care.                    Chris Darby MD  [address] [facility] [facility], [facility] [address]  Via Fax: [phone]          PT Evaluation     Today's date: 2025  Patient name: Lali Barrios  : 1955  MRN: [id number]  Referring provider: Chris Darby MD  Dx:   Encounter Diagnosis     ICD-10-CM    1. Presence of right artificial hip joint  Z96.641       2. Impaired functional mobility, balance, gait, and endurance  Z74.09                      Assessment  Impairments: abnormal gait, abnormal or restricted ROM, activity intolerance, impaired balance, impaired physical strength, lacks appropriate home exercise program, pain with function, weight-bearing intolerance and activity limitations  Other impairment: dereased flexibility    Assessment details: The patient is a 70 y/o female who presents to OPPT s/p R THR (posterior approach) on 25 by Dr. Darby.  She has  complaints of constant pain since surgery.  Pain is typically in her hip and into her upper thigh, she denies any N&T into RLE.  She demonstrates deficits with decreased ROM and strength, decreased flexibility, decreased balance and proprioception and TTP.  These deficits lead to antalgic gait with intermittent use of AD, difficulty with stair negotiation, limited standing tolerance, difficulty sleeping and decreased tolerance to functional activities.  She ambulates intermittently without AD in the house but does use SPC for community distances.  Slow yulissa noted along with short step and stride length and decreased weight shift to RLE in stance phase.  She has difficulty with stair negotiation and has been going up and down the steps with non-reciprocal gait pattern.  Difficulty sleeping also noted, pain can wake her up overnight or she has a hard time finding a comfortable position to sleep.  She is currently off work.  Secondary to surgery and above deficits she is limited with her overall mobility and function.  The patient would benefit from continued PT to address deficits and improve function.  Tx to include ROM, stretching, strengthening, modalities, HEP, pt education, postural ed, lifting/body mechanics, neuro re-ed, balance/proprioception Te, MT and equipment.      Understanding of Dx/Px/POC: good     Prognosis: good    Goals  STGs:  1.  Initiate and complete HEP with verbal cues.  2.  Improve R hip ROM by 5-10 degrees in 4 weeks.  3.  Improve R LE strength by 1/2-1 grade in 4 weeks.  4.  Decrease R hip pain by > 25% in 4 weeks.  LTGs:  1.  Patient to be I with HEP in 12 weeks.  2.  Improve R Hip ROM to WNL t/o in 12 weeks to improve function.  3.  Improve R LE strength to > or = to 4 to 4+/5 t/o in 12 weeks to improve function.  4.  Decrease R hip pain to < or = to 1-2/10 with activity in 12 weeks to improve function.   5.  Patient to ambulate with normalized gait pattern without AD in 12 weeks.  6.   Stair negotiation is improved to PLOF in 12 weeks.  7.  Recreational performance is improved to PLOF in 12 weeks.  8.  ADL performance is improved to PLOF in 12 weeks.  9.  Work performance is improved to maximal level of function in 12 weeks.   10.  FOTO score to meet or exceed expected outcomes in 12 weeks.          Plan  Patient would benefit from: skilled physical therapy  Planned modality interventions: cryotherapy    Planned therapy interventions: manual therapy, balance, balance/weight bearing training, neuromuscular re-education, patient/caregiver education, postural training, self care, strengthening, stretching, therapeutic activities, therapeutic exercise, flexibility, functional ROM exercises, gait training and home exercise program    Frequency: 2x week  Duration in weeks: 12  Plan of Care beginning date: 5/30/2025  Plan of Care expiration date: 8/22/2025  Treatment plan discussed with: patient  Plan details: Modalities and therapy interventions prn.          Subjective Evaluation    History of Present Illness  Mechanism of injury: The patient reports h/o R hip pain and arthritis.  She had tried injections.  She did have a fall in February in which she had increased pain in her hip after this fall.  She elected to have R THR.        She is s/p R THR - posterior approach - on 4/28/25 by Dr. Darby.  She had surgery at [facility].  She was kept 3 nights in the hospital with discharge to home.  She did have course of HHPT.  She was discharged from home care a week ago.  She did have one post-op appointment with the PA, per patient they are happy with how she is doing so far.    She was referred to OPPT and she now presents for her evaluation.  She will be going back to see the doctor in July for her follow up appointment.      The patient states that she has been having pain in her hip and along her incision.  Her pain has been constant.  She does note difficulty with dressing, especially with her  "shoes and socks.   She denies any N&T into RLE.    Patient Goals  Patient goals for therapy: increased motion, improved balance, decreased pain, increased strength, return to sport/leisure activities, independence with ADLs/IADLs and return to work  Patient goal: \"To get into my car easier, to do the steps normally, to get back to work and my normal life.\"  Pain  At best pain rating: 3  At worst pain ratin  Location: R Hip - hip into her thigh  Quality: discomfort and dull ache  Relieving factors: rest and ice  Aggravating factors: walking (Rolling over in bed)    Social Support  Steps to enter house: yes  2  Stairs in house: yes   Lives in: multiple-level home  Lives with: spouse ( is disabled)    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     Additional Observation Details  Incision present along R hip - healed.  No active bleeding or drainage noted, no redness or warmth.  Fair scar mobility noted.      Tenderness     Additional Tenderness Details  TTP noted around surgical incision.      Neurological Testing     Sensation     Hip   Left Hip   Intact: light touch    Right Hip   Intact: light touch    Active Range of Motion   Left Hip   Flexion: 90 degrees   Abduction: 35 degrees     Right Hip   Flexion: 70 degrees with pain  Abduction: 18 degrees with pain    Additional Active Range of Motion Details  L SLR: 50  R SLR: 40    Strength/Myotome Testing     Left Hip   Planes of Motion   Flexion: WFL  Abduction: WFL  Adduction: WFL  External rotation: WFL  Internal rotation: WFL    Right Hip   Planes of Motion   Flexion: 3  Abduction: 3-  Adduction: 4-  External rotation: 3+  Internal rotation: 3+    Ambulation   Weight-Bearing Status   Assistive device used: single point cane    Additional Weight-Bearing Status Details  Walking intermittently without AD in the house.  Using SPC for outdoor " ambulation.    Ambulation: Stairs   Ascend stairs: independent  Pattern: non-reciprocal  Descend stairs: independent  Pattern: non-reciprocal    Additional Stairs Ambulation Details  Has been doing them with non-reciprocal gait pattern even prior to surgery 2* h/o R TKR.      Observational Gait   Gait: antalgic   Decreased walking speed, stride length and left stance time.                 POC expires Unit limit Auth Expiration date PT/OT/ST + Visit Limit?   8/22/25 Ortho-BOMN N/A BOMN                           Visit/Unit Tracking  AUTH Status:  Date               N/A Used                Remaining                      Precautions: R THR - DOS: 4/28/25 - follow total hip precautions  PMH: R TKR, Arthritis, Anxiety, chronic pain, HTN, R shoulder surgery     Manuals 5/30       R Hip                                 Neuro Re-Ed         SLS        Tandem Stance        Sidestepping                                        Ther Ex        Bike        HR/TR HR x 10       Stand SLR Abd & Ext 10x ea       Marches 10x       Minisquats 10       TKE w/TBand        LAQ        QSets        SAQ        SLR        Bridges        Heel Slides                        Ther Activity        STS        Stepups F/L        Stepdowns        Gait Training                        Modalities

## 2025-06-04 ENCOUNTER — OFFICE VISIT (OUTPATIENT)
Dept: PHYSICAL THERAPY | Facility: CLINIC | Age: 70
End: 2025-06-04
Payer: MEDICARE

## 2025-06-04 DIAGNOSIS — Z74.09 IMPAIRED FUNCTIONAL MOBILITY, BALANCE, GAIT, AND ENDURANCE: ICD-10-CM

## 2025-06-04 DIAGNOSIS — Z96.641 PRESENCE OF RIGHT ARTIFICIAL HIP JOINT: Primary | ICD-10-CM

## 2025-06-04 PROCEDURE — 97110 THERAPEUTIC EXERCISES: CPT

## 2025-06-04 PROCEDURE — 97140 MANUAL THERAPY 1/> REGIONS: CPT

## 2025-06-04 NOTE — PROGRESS NOTES
"Daily Note     Today's date: 2025  Patient name: Lali Barrios  : 1955  MRN: [id number]  Referring provider: Chris Darby MD  Dx:   Encounter Diagnosis     ICD-10-CM    1. Presence of right artificial hip joint  Z96.641       2. Impaired functional mobility, balance, gait, and endurance  Z74.09                      Subjective: pt offers no new complaints.       Objective: See treatment diary below      Assessment: Introduced POC of strengthening and stability for the hip. No onset of pain with completion of strengthening exercises. Will continue to progress as able. Tolerated treatment well. Patient would benefit from continued PT      Plan: Continue per plan of care.      POC expires Unit limit Auth Expiration date PT/OT/ST + Visit Limit?   25 Ortho-BOMN N/A BOMN                                           Visit/Unit Tracking  AUTH Status:  Date                           N/A Used                             Remaining                                     Precautions: R THR - DOS: 25 - follow total hip precautions  PMH: R TKR, Arthritis, Anxiety, chronic pain, HTN, R shoulder surgery      Manuals           R Hip     AM                                                   Neuro Re-Ed              SLS    5-10\" 5x          Tandem Stance             Sidestepping    1 lap rtb                                                                  Ther Ex             Bike    5 min no resistance          HR/TR HR x 10  10x          Stand SLR Abd & Ext 10x ea           Marches 10x           Minisquats 10           TKE w/TBand             LAQ             QSets    10\" 10x          SLR    2x10          S/l abd   2x10       Bridges    2x10          Heel Slides                                         Ther Activity             STS             Stepups F/L             Stepdowns             Gait Training                                         Modalities                                             "

## 2025-06-05 ENCOUNTER — OFFICE VISIT (OUTPATIENT)
Dept: PHYSICAL THERAPY | Facility: CLINIC | Age: 70
End: 2025-06-05
Payer: MEDICARE

## 2025-06-05 DIAGNOSIS — Z96.641 PRESENCE OF RIGHT ARTIFICIAL HIP JOINT: Primary | ICD-10-CM

## 2025-06-05 DIAGNOSIS — Z74.09 IMPAIRED FUNCTIONAL MOBILITY, BALANCE, GAIT, AND ENDURANCE: ICD-10-CM

## 2025-06-05 PROCEDURE — 97140 MANUAL THERAPY 1/> REGIONS: CPT

## 2025-06-05 PROCEDURE — 97110 THERAPEUTIC EXERCISES: CPT

## 2025-06-05 NOTE — PROGRESS NOTES
"Daily Note     Today's date: 2025  Patient name: Lali Barrios  : 1955  MRN: [id number]  Referring provider: Chris Darby MD  Dx:   Encounter Diagnosis     ICD-10-CM    1. Presence of right artificial hip joint  Z96.641       2. Impaired functional mobility, balance, gait, and endurance  Z74.09                      Subjective: Pt reported she was not felling great today, and that her R. shld. is still having pain.       Objective: See treatment diary below      Assessment: Pt tolerated tx fair today. Pt reported min. pain with hip PROM flexion, and soreness after completing exercises. PT held sidestepping w/tb due to R. shld. pain when holding bar. Pt was instructed to reach out to MD if R. shld. pain persists. PT will progress as able next session. Patient treated by LENORE Galdamez under direct PT supervision.         Plan: Continue per plan of care.        POC expires Unit limit Auth Expiration date PT/OT/ST + Visit Limit?   25 Ortho-BOMN N/A BOMN                                           Visit/Unit Tracking  AUTH Status:  Date                           N/A Used                             Remaining                                     Precautions: R THR - DOS: 25 - follow total hip precautions  PMH: R TKR, Arthritis, Anxiety, chronic pain, HTN, R shoulder surgery      Manuals        R Hip     AM  KG                                                 Neuro Re-Ed              SLS    5-10\" 5x   10\" 5x       Tandem Stance             Sidestepping    1 lap rtb   held                                                                Ther Ex             Bike    5 min no resistance   5 min no resistance        HR/TR HR x 10  10x   2x10       Stand SLR Abd & Ext 10x ea           Marches 10x           Minisquats 10           TKE w/TBand             LAQ             QSets    10\" 10x   10\" 10x        SLR    2x10   2x10        S/l abd   2x10  2x10      Bridges    2x10   2x10       Heel " Slides                                         Ther Activity             STS             Stepups F/L             Stepdowns             Gait Training                                         Modalities                                                no chills/no decreased eating/drinking/no fever/no nausea/no tingling/no vomiting

## 2025-06-09 ENCOUNTER — APPOINTMENT (OUTPATIENT)
Dept: PHYSICAL THERAPY | Facility: CLINIC | Age: 70
End: 2025-06-09
Payer: MEDICARE

## 2025-06-11 ENCOUNTER — OFFICE VISIT (OUTPATIENT)
Dept: PHYSICAL THERAPY | Facility: CLINIC | Age: 70
End: 2025-06-11
Payer: MEDICARE

## 2025-06-11 DIAGNOSIS — Z74.09 IMPAIRED FUNCTIONAL MOBILITY, BALANCE, GAIT, AND ENDURANCE: ICD-10-CM

## 2025-06-11 DIAGNOSIS — Z96.641 PRESENCE OF RIGHT ARTIFICIAL HIP JOINT: Primary | ICD-10-CM

## 2025-06-11 PROCEDURE — 97110 THERAPEUTIC EXERCISES: CPT

## 2025-06-11 PROCEDURE — 97140 MANUAL THERAPY 1/> REGIONS: CPT

## 2025-06-11 NOTE — PROGRESS NOTES
"Daily Note     Today's date: 2025  Patient name: Lali Barrios  : 1955  MRN: [id number]  Referring provider: Chris Darby MD  Dx:   Encounter Diagnosis     ICD-10-CM    1. Presence of right artificial hip joint  Z96.641       2. Impaired functional mobility, balance, gait, and endurance  Z74.09                      Subjective: pt reports she is sore in the hip and is still having trouble with her shoulder.       Objective: See treatment diary below      Assessment: Stiffness in the hip with PROM. Difficulty with s/l abd due to weakness. Introduced STS to chair without UE support. Will progress as able. Tolerated treatment well. Patient would benefit from continued PT      Plan: Continue per plan of care.        POC expires Unit limit Auth Expiration date PT/OT/ST + Visit Limit?   25 Ortho-BOMN N/A BOMN                                           Visit/Unit Tracking  AUTH Status:  Date                           N/A Used                             Remaining                                     Precautions: R THR - DOS: 25 - follow total hip precautions  PMH: R TKR, Arthritis, Anxiety, chronic pain, HTN, R shoulder surgery      Manuals      R Hip     AM  KG                                                 Neuro Re-Ed              SLS    5-10\" 5x   10\" 5x  10\" 5x      Tandem Stance             Sidestepping    1 lap rtb   held                                                                Ther Ex             Bike    5 min no resistance   5 min no resistance   5 min no resistance      HR/TR HR x 10  10x   2x10  2x10     Stand SLR Abd & Ext 10x ea           Marches 10x           Minisquats 10           TKE w/TBand             LAQ             QSets    10\" 10x   10\" 10x   10\" 10x      SLR    2x10   2x10   2x10      S/l abd   2x10  2x10  2x10    Bridges    2x10   2x10  2x10      Heel Slides              STS       Chair 10x                    Ther Activity             STS          "    Stepups F/L             Stepdowns             Gait Training                                         Modalities

## 2025-06-13 ENCOUNTER — HOSPITAL ENCOUNTER (EMERGENCY)
Facility: HOSPITAL | Age: 70
Discharge: HOME | End: 2025-06-13
Attending: EMERGENCY MEDICINE
Payer: MEDICARE

## 2025-06-13 ENCOUNTER — APPOINTMENT (EMERGENCY)
Dept: RADIOLOGY | Facility: HOSPITAL | Age: 70
End: 2025-06-13
Payer: MEDICARE

## 2025-06-13 VITALS
RESPIRATION RATE: 18 BRPM | HEART RATE: 60 BPM | TEMPERATURE: 98.2 F | WEIGHT: 138 LBS | BODY MASS INDEX: 26.06 KG/M2 | SYSTOLIC BLOOD PRESSURE: 133 MMHG | DIASTOLIC BLOOD PRESSURE: 65 MMHG | HEIGHT: 61 IN | OXYGEN SATURATION: 100 %

## 2025-06-13 DIAGNOSIS — S49.90XA SHOULDER INJURY: ICD-10-CM
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
## 2025-06-13 PROCEDURE — 99284 EMERGENCY DEPT VISIT MOD MDM: CPT

## 2025-06-13 PROCEDURE — 96372 THER/PROPH/DIAG INJ SC/IM: CPT

## 2025-06-13 PROCEDURE — 73030 X-RAY EXAM OF SHOULDER: CPT

## 2025-06-13 RX ORDER — KETOROLAC TROMETHAMINE 30 MG/ML
30 INJECTION, SOLUTION INTRAMUSCULAR; INTRAVENOUS ONCE
Status: COMPLETED | OUTPATIENT
Start: 2025-06-13 | End: 2025-06-13

## 2025-06-13 RX ORDER — LIDOCAINE 50 MG/G
1 PATCH TOPICAL ONCE
Status: DISCONTINUED | OUTPATIENT
Start: 2025-06-13 | End: 2025-06-13 | Stop reason: HOSPADM

## 2025-06-13 RX ADMIN — KETOROLAC TROMETHAMINE 30 MG: 30 INJECTION, SOLUTION INTRAMUSCULAR at 09:40

## 2025-06-13 RX ADMIN — LIDOCAINE 5% 1 PATCH: 700 PATCH TOPICAL at 09:40

## 2025-06-13 NOTE — ED PROVIDER NOTES
Time reflects when diagnosis was documented in both MDM as applicable and the Disposition within this note       Time User Action Codes Description Comment    6/13/2025  9:34 AM Mary Kirk Add [W19.XXXA] Fall, initial encounter     6/13/2025  9:34 AM Mary Kirk Add [S49.90XA] Shoulder injury           ED Disposition       ED Disposition   Discharge    Condition   Stable    Date/Time   Fri Jun 13, 2025  9:34 AM    Comment   Lali Denis discharge to home/self care.                   Assessment & Plan       Medical Decision Making  70 y/o female patient presents to the ER for evaluation of shoulder injury. Patient stated that she was walking up her stairs in her house when the wash basket hit the stairs causing her to grab the railing and jerk her right shoulder. Patient stated that she had rotator cuff injury on her right shoulder a few years ago with [facility]. Patient reports over the last couple of days the pain is worse with extension and abduction. She reports that she called the orthopedic office and was told to come to the ER for evaluation. Patient has no ecchymosis, erythema, open wounds/abrasions. No noted head strike, LOC, or BT.  She is right hand dominant. Neurovascularly intact, sensation equal bilaterally. Patient walks with a cane due to recent hip replacement. Taking OTC medications with minimal relief. Given history, exam and workup patient may have rotator cuff injury. I have low suspicion for fracture, dislocation,  septic arthritis, gout or new autoimmune arthropathy.  Patient was offered a sling. Educated about sling care and not to wear it all the time to perform exercises and not develop frozen shoulder. Patient verbalized understanding. Advised to follow-up with orthopedics.  Multi modal regimen given for pain relief.  Return to the ER symptoms worsens or questions or concerns arise at home.      Amount and/or Complexity of Data Reviewed  Radiology:  "ordered.    Risk  Prescription drug management.             Medications   lidocaine (LIDODERM) 5 % patch 1 patch (1 patch Topical Medication Applied 6/13/25 0940)   ketorolac (TORADOL) injection 30 mg (30 mg Intramuscular Given 6/13/25 0940)       ED Risk Strat Scores                    (ISAR) Identification of Seniors at Risk  Before the illness or injury that brought you to the Emergency, did you need someone to help you on a regular basis?: 0  In the last 24 hours, have you needed more help than usual?: 0  Have you been hospitalized for one or more nights during the past 6 months?: 0  In general, do you see well?: 0  In general, do you have serious problems with your memory?: 0  Do you take more than three different medications every day?: 1  ISAR Score: 1            SBIRT 22yo+      Flowsheet Row Most Recent Value   Initial Alcohol Screen: US AUDIT-C     1. How often do you have a drink containing alcohol? 0 Filed at: 06/13/2025 0911   2. How many drinks containing alcohol do you have on a typical day you are drinking?  0 Filed at: 06/13/2025 0911   3b. FEMALE Any Age, or MALE 65+: How often do you have 4 or more drinks on one occassion? 0 Filed at: 06/13/2025 0911   Audit-C Score 0 Filed at: 06/13/2025 0911   PAYTON: How many times in the past year have you...    Used an illegal drug or used a prescription medication for non-medical reasons? Never Filed at: 06/13/2025 0911                            History of Present Illness       Chief Complaint   Patient presents with    Fall     Patient co right shoulder pain that started after she fell a few days ago. Patient states \"I was walking and tripped.\" Denies head strike, - LOC, - Blood thinners.  Denies dizziness.       Past Medical History[1]   Past Surgical History[2]   Family History[3]   Social History[4]   E-Cigarette/Vaping    E-Cigarette Use Never User       E-Cigarette/Vaping Substances    Nicotine No     THC No     CBD No     Flavoring No     Other No     " Unknown No       I have reviewed and agree with the history as documented.     68 y/o female patient presents to the ER for evaluation of fall.       Fall  Associated symptoms: no abdominal pain, no back pain, no chest pain, no seizures and no vomiting        Review of Systems   Constitutional:  Negative for chills and fever.   HENT:  Negative for ear pain and sore throat.    Eyes:  Negative for pain and visual disturbance.   Respiratory:  Negative for cough and shortness of breath.    Cardiovascular:  Negative for chest pain and palpitations.   Gastrointestinal:  Negative for abdominal pain and vomiting.   Genitourinary:  Negative for dysuria and hematuria.   Musculoskeletal:  Positive for arthralgias (right shoulder). Negative for back pain.   Skin:  Negative for color change and rash.   Neurological:  Negative for seizures and syncope.   All other systems reviewed and are negative.          Objective       ED Triage Vitals   Temperature Pulse Blood Pressure Respirations SpO2 Patient Position - Orthostatic VS   06/13/25 0909 06/13/25 0909 06/13/25 0909 06/13/25 0909 06/13/25 0909 06/13/25 0909   98.2 °F (36.8 °C) 82 (!) 178/74 18 98 % Sitting      Temp Source Heart Rate Source BP Location FiO2 (%) Pain Score    06/13/25 0909 06/13/25 0909 06/13/25 0909 -- 06/13/25 0940    Oral Monitor Right arm  7      Vitals      Date and Time Temp Pulse SpO2 Resp BP Pain Score FACES Pain Rating User   06/13/25 1003 -- 60 100 % 18 133/65 -- --    06/13/25 0940 -- -- -- -- -- 7 --    06/13/25 0909 98.2 °F (36.8 °C) 82 98 % 18 178/74 -- -- JT            Physical Exam  Vitals and nursing note reviewed.   Constitutional:       General: She is not in acute distress.     Appearance: She is well-developed.   HENT:      Head: Normocephalic and atraumatic.     Eyes:      Conjunctiva/sclera: Conjunctivae normal.       Cardiovascular:      Rate and Rhythm: Normal rate and regular rhythm.      Heart sounds: No murmur heard.  Pulmonary:       Effort: Pulmonary effort is normal. No respiratory distress.      Breath sounds: Normal breath sounds.   Abdominal:      Palpations: Abdomen is soft.      Tenderness: There is no abdominal tenderness.     Musculoskeletal:         General: No swelling.      Right shoulder: Tenderness present. Decreased range of motion. Normal pulse.      Cervical back: Neck supple.     Skin:     General: Skin is warm and dry.      Capillary Refill: Capillary refill takes less than 2 seconds.     Neurological:      Mental Status: She is alert.     Psychiatric:         Mood and Affect: Mood normal.         Results Reviewed       None            XR shoulder 2+ views RIGHT    (Results Pending)       Procedures    ED Medication and Procedure Management   Prior to Admission Medications   Prescriptions Last Dose Informant Patient Reported? Taking?   LORazepam (ATIVAN) 1 mg tablet  Self Yes No   Sig: Take 1 mg by mouth 2 (two) times a day as needed   amitriptyline (ELAVIL) 100 mg tablet   Yes No   Sig: Take 100 mg by mouth daily at bedtime   atorvastatin (LIPITOR) 20 mg tablet  Self Yes No   Sig: TAKE 1 TABLET BY MOUTH EVERY DAY AT NIGHT   diclofenac (VOLTAREN) 75 mg EC tablet  Self Yes No   Sig: Take 75 mg by mouth 2 (two) times a day   meclizine (ANTIVERT) 12.5 MG tablet   No No   Sig: Take 1 tablet (12.5 mg total) by mouth every 8 (eight) hours as needed for dizziness   naproxen (NAPROSYN) 500 mg tablet  Self Yes No   Sig: as needed   oxybutynin (DITROPAN XL) 15 MG 24 hr tablet  Self Yes No   Sig: Take 15 mg by mouth daily   pantoprazole (PROTONIX) 40 mg tablet   No No   Sig: TAKE 1 TABLET BY MOUTH EVERY DAY   patient supplied medication  Self Yes No   topiramate (TOPAMAX) 200 MG tablet  Self Yes No   Sig: Take 200 mg by mouth 2 (two) times a day      Facility-Administered Medications: None     Discharge Medication List as of 6/13/2025  9:41 AM        CONTINUE these medications which have NOT CHANGED    Details   amitriptyline  (ELAVIL) 100 mg tablet Take 100 mg by mouth daily at bedtime, Historical Med      atorvastatin (LIPITOR) 20 mg tablet TAKE 1 TABLET BY MOUTH EVERY DAY AT NIGHT, Historical Med      diclofenac (VOLTAREN) 75 mg EC tablet Take 75 mg by mouth 2 (two) times a day, Starting Mon 2019, Historical Med      LORazepam (ATIVAN) 1 mg tablet Take 1 mg by mouth 2 (two) times a day as needed, Starting Wed 2019, Historical Med      meclizine (ANTIVERT) 12.5 MG tablet Take 1 tablet (12.5 mg total) by mouth every 8 (eight) hours as needed for dizziness, Starting Sat 2021, Normal      naproxen (NAPROSYN) 500 mg tablet as needed, Starting Mon 2019, Historical Med      oxybutynin (DITROPAN XL) 15 MG 24 hr tablet Take 15 mg by mouth daily, Starting Sat 2019, Historical Med      pantoprazole (PROTONIX) 40 mg tablet TAKE 1 TABLET BY MOUTH EVERY DAY, Normal      patient supplied medication Historical Med      topiramate (TOPAMAX) 200 MG tablet Take 200 mg by mouth 2 (two) times a day, Starting Sun 2019, Historical Med             ED SEPSIS DOCUMENTATION   Time reflects when diagnosis was documented in both MDM as applicable and the Disposition within this note       Time User Action Codes Description Comment    2025  9:34 AM Mary Kirk Add [W19.XXXA] Fall, initial encounter     2025  9:34 AM Mary Kirk Add [S49.90XA] Shoulder injury                      [1]   Past Medical History:  Diagnosis Date    Arthritis     Hyperlipidemia     Migraines    [2]   Past Surgical History:  Procedure Laterality Date     SECTION      two     HYSTERECTOMY      JOINT REPLACEMENT Right     REPLACEMENT TOTAL KNEE      SHOULDER ARTHROSCOPY Right    [3]   Family History  Problem Relation Name Age of Onset    Diabetes Father     [4]   Social History  Tobacco Use    Smoking status: Never    Smokeless tobacco: Never   Vaping Use    Vaping status: Never Used   Substance Use Topics    Alcohol use: Not Currently    Drug  use: Never        JUAN Damon  06/13/25 1019

## 2025-06-13 NOTE — DISCHARGE INSTRUCTIONS
Tylenol/Motrin  May use topical ointments for muscle pain; Bengay, icy hot, Voltaren   Ice/Heating pad, stretches  Follow up with orthopedics  Return to ER if symptoms worsen

## 2025-06-16 ENCOUNTER — APPOINTMENT (OUTPATIENT)
Dept: PHYSICAL THERAPY | Facility: CLINIC | Age: 70
End: 2025-06-16
Payer: MEDICARE

## 2025-06-17 ENCOUNTER — OFFICE VISIT (OUTPATIENT)
Dept: PHYSICAL THERAPY | Facility: CLINIC | Age: 70
End: 2025-06-17
Payer: MEDICARE

## 2025-06-17 DIAGNOSIS — Z96.641 PRESENCE OF RIGHT ARTIFICIAL HIP JOINT: Primary | ICD-10-CM

## 2025-06-17 DIAGNOSIS — Z74.09 IMPAIRED FUNCTIONAL MOBILITY, BALANCE, GAIT, AND ENDURANCE: ICD-10-CM

## 2025-06-17 PROCEDURE — 97112 NEUROMUSCULAR REEDUCATION: CPT

## 2025-06-17 PROCEDURE — 97110 THERAPEUTIC EXERCISES: CPT

## 2025-06-17 NOTE — PROGRESS NOTES
"Daily Note     Today's date: 2025  Patient name: Lali Barrios  : 1955  MRN: [id number]  Referring provider: Chris Darby MD  Dx:   Encounter Diagnosis     ICD-10-CM    1. Presence of right artificial hip joint  Z96.641       2. Impaired functional mobility, balance, gait, and endurance  Z74.09           Start Time: 0930  Stop Time: 1010  Total time in clinic (min): 40 minutes    Subjective: Pt reports no new issues today, minimal soreness from last visit.      Objective: See treatment diary below      Assessment: Pt completed program today within tolerance. Pt utilized rest breaks between sets of STS due to fatigue. Required minimal verbal/tactile cues for correct form and sequencing of exercises. CGA during STS from chair for safety. Pt was only able to complete 10x reps of STS due to onset of fatigue and inability to safely complete additional reps.      Plan: Continue per plan of care.  Progress treatment as tolerated.         POC expires Unit limit Auth Expiration date PT/OT/ST + Visit Limit?   25 Ortho-BOMN N/A BOMN                                           Visit/Unit Tracking  AUTH Status:  Date                           N/A Used                             Remaining                                     Precautions: R THR - DOS: 25 - follow total hip precautions  PMH: R TKR, Arthritis, Anxiety, chronic pain, HTN, R shoulder surgery      Manuals    R Hip     AM  KG                                                 Neuro Re-Ed              SLS    5-10\" 5x   10\" 5x  10\" 5x   nt   Tandem Stance             Sidestepping    1 lap rtb   held                                                                Ther Ex             Bike    5 min no resistance   5 min no resistance   5 min no resistance   5 min no resistance   HR/TR HR x 10  10x   2x10  2x10  2x10   Stand SLR Abd & Ext 10x ea           Marches 10x           Minisquats 10           TKE w/TBand           " "  LAQ             QSets    10\" 10x   10\" 10x   10\" 10x   10\"x10   SLR    2x10   2x10   2x10   2x10   S/l abd   2x10  2x10  2x10 2x10   Bridges    2x10   2x10  2x10   2x10   Heel Slides              STS       Chair 10x   chair x10                 Ther Activity             STS             Stepups F/L             Stepdowns             Gait Training                                         Modalities                                                   "

## 2025-06-19 ENCOUNTER — APPOINTMENT (OUTPATIENT)
Dept: PHYSICAL THERAPY | Facility: CLINIC | Age: 70
End: 2025-06-19
Payer: MEDICARE

## 2025-06-25 ENCOUNTER — APPOINTMENT (OUTPATIENT)
Dept: PHYSICAL THERAPY | Facility: CLINIC | Age: 70
End: 2025-06-25
Payer: MEDICARE

## 2025-07-16 ENCOUNTER — EVALUATION (OUTPATIENT)
Dept: PHYSICAL THERAPY | Facility: CLINIC | Age: 70
End: 2025-07-16
Payer: MEDICARE

## 2025-07-16 DIAGNOSIS — Z74.09 IMPAIRED FUNCTIONAL MOBILITY, BALANCE, GAIT, AND ENDURANCE: Primary | ICD-10-CM

## 2025-07-16 DIAGNOSIS — Z96.641 PRESENCE OF RIGHT ARTIFICIAL HIP JOINT: ICD-10-CM

## 2025-07-16 PROCEDURE — 97110 THERAPEUTIC EXERCISES: CPT

## 2025-07-16 NOTE — PROGRESS NOTES
PT Re-Evaluation     Today's date: 2025  Patient name: Lali Barrios  : 1955  MRN: [id number]  Referring provider: Chris Darby MD  Dx:   Encounter Diagnosis     ICD-10-CM    1. Impaired functional mobility, balance, gait, and endurance  Z74.09       2. Presence of right artificial hip joint  Z96.641             Start Time: 1550  Stop Time: 1630  Total time in clinic (min): 40 minutes    Assessment  Impairments: abnormal gait, abnormal or restricted ROM, activity intolerance, impaired balance, impaired physical strength, pain with function, weight-bearing intolerance and activity limitations  Other impairment: dereased flexibility    Assessment details: Patient is a 68 y/o female who presents to OPPT s/p R THR (posterior approach) on 25 by Dr. Darby. At re-evaluation, pt's R. hip flexion, abduction, and SLR ROM has improved and is pain free. Her R. hip flexion and abduction also improved in strength, but other directions did not improve. Pt's WB and walking tolerance has increased, and she is currently only using AD for stairs without handrails. Her pain has decreased to only a 2-3/10 at its worst, and she reports some tenderness along her incision. Pt also says she is better at going down stairs and letting her dog out, and transferring in and out of bed is easier and she doesn't need a step stool anymore. Pt still reports difficulty with STSs, walking her dog on a leash, and certain activities like putting on socks. She says she still needs improvement in her strength and stiffness/weakness is what is limiting her most. She reports following HEP, but the step ups are difficult for her. Pt's FOTO score improved from a 42 to 52 since initial evaluation. Pt would benefit from skilled physical therapy to further increase LE strength, improve balance, increase tolerance to WB and walking, and to activities like walking her dog. Pt treated by LENORE Galdamez, under direct PT  supervision.  Understanding of Dx/Px/POC: good     Prognosis: good    Goals  STGs:  1.  Initiate and complete HEP with verbal cues... MET  2.  Improve R hip ROM by 5-10 degrees in 4 weeks... MET  3.  Improve R LE strength by 1/2-1 grade in 4 weeks... PROGRESSING  4.  Decrease R hip pain by > 25% in 4 weeks... MET    LTGs:  1.  Patient to be I with HEP in 12 weeks... PROGRESSING  2.  Improve R Hip ROM to WNL t/o in 12 weeks to improve function.... PROGRESSING  3.  Improve R LE strength to > or = to 4 to 4+/5 t/o in 12 weeks to improve function.... PROGRESSING  4.  Decrease R hip pain to < or = to 1-2/10 with activity in 12 weeks to improve function... MET  5.  Patient to ambulate with normalized gait pattern without AD in 12 weeks.... PROGRESSING  6.  Stair negotiation is improved to PLOF in 12 weeks.... PROGRESSING  7.  Recreational performance is improved to PLOF in 12 weeks.... PROGRESSING  8.  ADL performance is improved to PLOF in 12 weeks.... PROGRESSING  9.  Work performance is improved to maximal level of function in 12 weeks. ... PROGRESSING  10. FOTO score to meet or exceed expected outcomes in 12 weeks.... PROGRESSING        Plan  Patient would benefit from: skilled physical therapy  Planned modality interventions: cryotherapy    Planned therapy interventions: manual therapy, balance, balance/weight bearing training, neuromuscular re-education, patient/caregiver education, postural training, self care, strengthening, stretching, therapeutic activities, therapeutic exercise, flexibility, functional ROM exercises, gait training and home exercise program    Frequency: 2x week  Plan of Care beginning date: 7/16/2025  Plan of Care expiration date: 9/10/2025  Treatment plan discussed with: patient      Subjective Evaluation    History of Present Illness  Mechanism of injury: surgery  Mechanism of injury: Re-Eval:  Pt is s/p R THR - posterior approach - on 4/28/25 by Dr. Darby. She had surgery at [facility]  [facility]. She's recently had double cataract surgery and ortho consult for shoulder/ rotator cuff pain, so has been out of PT for a few weeks. She reports she is going to see  tomorrow for THR follow up. She also arrives without using her cane and reports she has not been for a few weeks now, expect for stairs with no HR.  Pt says she is better at going down stairs and letting her dog out, can stand/WB for longer periods of time, walking is easier, and transferring in and out of bed is easier and she doesn't need a step stool anymore.   She says she still needs improvement in her strength and stiffness/weakness is what is limiting her most. She says STS, walking her dog on a leash, and certain activities like putting on socks are still difficult/ she can't do. Pt says her pain is 'okay' and only gets to ~2-3/10 at its worst. She reports following HEP, but the step ups are difficult for her.     IE:  She is s/p R THR - posterior approach - on 4/28/25 by Dr. Darby.  She had surgery at [facility].  She was kept 3 nights in the hospital with discharge to home.  She did have course of HHPT.  She was discharged from home care a week ago.  She did have one post-op appointment with the PA, per patient they are happy with how she is doing so far.    She was referred to OPPT and she now presents for her evaluation.  She will be going back to see the doctor in July for her follow up appointment.      The patient states that she has been having pain in her hip and along her incision.  Her pain has been constant.  She does note difficulty with dressing, especially with her shoes and socks.   She denies any N&T into RLE.    Patient Goals  Patient goals for therapy: increased motion, improved balance, decreased pain, increased strength, return to sport/leisure activities, independence with ADLs/IADLs and return to work  Patient goal: \"To get into my car easier, to do the steps normally, to get back to work and my normal " "life.\"  Pain  Current pain ratin  At best pain ratin  At worst pain rating: 3  Location: R Hip - hip into her thigh  Quality: discomfort  Relieving factors: rest and ice  Aggravating factors: walking (Rolling over in bed)    Social Support  Steps to enter house: yes  2  Stairs in house: yes   Lives in: multiple-level home  Lives with: spouse ( is disabled)    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    Neurological Testing     Sensation     Hip   Left Hip   Intact: light touch    Right Hip   Intact: light touch    Active Range of Motion   Left Hip   Flexion: WFL  Abduction: 35 degrees     Right Hip   Flexion: 90 degrees WFL  Abduction: 30 degrees     Additional Active Range of Motion Details  Bilat SLR - pain free & wnl (~90 deg)    Strength/Myotome Testing     Left Hip   Planes of Motion   Flexion: WFL  Abduction: WFL  Adduction: WFL  External rotation: WFL  Internal rotation: WFL    Right Hip   Planes of Motion   Flexion: 3+  Abduction: 3  Adduction: 3+  External rotation: 3  Internal rotation: 4-    Ambulation   Weight-Bearing Status   Assistive device used: single point cane    Additional Weight-Bearing Status Details  Walking intermittently without AD in the house.  Using SPC for outdoor ambulation.    Ambulation: Stairs   Ascend stairs: independent  Pattern: non-reciprocal  Descend stairs: independent  Pattern: non-reciprocal    Additional Stairs Ambulation Details  Has been doing them with non-reciprocal gait pattern even prior to surgery 2* h/o R TKR.      Observational Gait   Gait: antalgic   Decreased walking speed, stride length and left stance time.     General Comments:      Hip Comments   5x STS: 19 sec w/ bilat UE chair support & forward trunk lean. R. shld pain.   TU sec, w/ bilat chair UE support       Flowsheet Rows      Flowsheet Row Most Recent Value " "  PT/OT G-Codes    Current Score 43   Projected Score 64                       POC expires Unit limit Auth Expiration date PT/OT/ST + Visit Limit?   9/10/25 Ortho-BOMN N/A BOMN                                           Visit/Unit Tracking  AUTH Status:  Date                           N/A Used                             Remaining                                     Precautions: R THR - DOS: 4/28/25 - follow total hip precautions  PMH: R TKR, Arthritis, Anxiety, chronic pain, HTN, R shoulder surgery   RE EVAL 8/13/25     Manuals 7/16 6/4 6/5 6/11 6/17   R Hip    AM  KG                                              Neuro Re-Ed          6/17   SLS   5-10\" 5x   10\" 5x  10\" 5x   nt   Tandem Stance            Sidestepping   1 lap rtb   held                                                            Ther Ex             Updated FOTO/measurements        Bike   5 min no resistance   5 min no resistance   5 min no resistance   5 min no resistance   HR/TR   10x   2x10  2x10  2x10   Stand SLR            Marches            Minisquats            TKE w/TBand            LAQ            QSets   10\" 10x   10\" 10x   10\" 10x   10\"x10   SLR   2x10   2x10   2x10   2x10   S/l abd   2x10  2x10  2x10 2x10   Bridges   2x10   2x10  2x10   2x10   Heel Slides             STS      Chair 10x   chair x10                Ther Activity            STS            Stepups F/L            Stepdowns            Gait Training                                      Modalities                                                  "

## 2025-07-16 NOTE — LETTER
2025    Chris Darby MD  [address] [facility], [facility] [address]    Patient: Lali Barrios   YOB: 1955   Date of Visit: 2025     Encounter Diagnosis     ICD-10-CM    1. Impaired functional mobility, balance, gait, and endurance  Z74.09       2. Presence of right artificial hip joint  Z96.641           Dear Dr. Chris Darby MD:    Thank you for your recent referral of Lali Barrios. Please review the attached evaluation summary from Lali's recent visit.     Please verify that you agree with the plan of care by signing the attached order.     If you have any questions or concerns, please do not hesitate to call.     I sincerely appreciate the opportunity to share in the care of one of your patients and hope to have another opportunity to work with you in the near future.       Sincerely,    Karla Jo, PT      Referring Provider:      I certify that I have read the below Plan of Care and certify the need for these services furnished under this plan of treatment while under my care.                    Chris Darby MD  [address] [facility] [facility] [address]  Via Fax: [phone]          PT Re-Evaluation     Today's date: 2025  Patient name: Lali Barrios  : 1955  MRN: [id number]  Referring provider: Chris Darby MD  Dx:   Encounter Diagnosis     ICD-10-CM    1. Impaired functional mobility, balance, gait, and endurance  Z74.09       2. Presence of right artificial hip joint  Z96.641             Start Time: 1550  Stop Time: 1630  Total time in clinic (min): 40 minutes    Assessment  Impairments: abnormal gait, abnormal or restricted ROM, activity intolerance, impaired balance, impaired physical strength, pain with function, weight-bearing intolerance and activity limitations  Other impairment: dereased flexibility    Assessment details: Patient is a 70 y/o female who presents to OPPT s/p R THR (posterior approach) on 25 by   Mehnaz. At re-evaluation, pt's R. hip flexion, abduction, and SLR ROM has improved and is pain free. Her R. hip flexion and abduction also improved in strength, but other directions did not improve. Pt's WB and walking tolerance has increased, and she is currently only using AD for stairs without handrails. Her pain has decreased to only a 2-3/10 at its worst, and she reports some tenderness along her incision. Pt also says she is better at going down stairs and letting her dog out, and transferring in and out of bed is easier and she doesn't need a step stool anymore. Pt still reports difficulty with STSs, walking her dog on a leash, and certain activities like putting on socks. She says she still needs improvement in her strength and stiffness/weakness is what is limiting her most. She reports following HEP, but the step ups are difficult for her. Pt's FOTO score improved from a 42 to 52 since initial evaluation. Pt would benefit from skilled physical therapy to further increase LE strength, improve balance, increase tolerance to WB and walking, and to activities like walking her dog. Pt treated by LENORE Galdamez, under direct PT supervision.  Understanding of Dx/Px/POC: good     Prognosis: good    Goals  STGs:  1.  Initiate and complete HEP with verbal cues... MET  2.  Improve R hip ROM by 5-10 degrees in 4 weeks... MET  3.  Improve R LE strength by 1/2-1 grade in 4 weeks... PROGRESSING  4.  Decrease R hip pain by > 25% in 4 weeks... MET    LTGs:  1.  Patient to be I with HEP in 12 weeks... PROGRESSING  2.  Improve R Hip ROM to WNL t/o in 12 weeks to improve function.... PROGRESSING  3.  Improve R LE strength to > or = to 4 to 4+/5 t/o in 12 weeks to improve function.... PROGRESSING  4.  Decrease R hip pain to < or = to 1-2/10 with activity in 12 weeks to improve function... MET  5.  Patient to ambulate with normalized gait pattern without AD in 12 weeks.... PROGRESSING  6.  Stair negotiation is improved  to PLOF in 12 weeks.... PROGRESSING  7.  Recreational performance is improved to PLOF in 12 weeks.... PROGRESSING  8.  ADL performance is improved to PLOF in 12 weeks.... PROGRESSING  9.  Work performance is improved to maximal level of function in 12 weeks. ... PROGRESSING  10. FOTO score to meet or exceed expected outcomes in 12 weeks.... PROGRESSING        Plan  Patient would benefit from: skilled physical therapy  Planned modality interventions: cryotherapy    Planned therapy interventions: manual therapy, balance, balance/weight bearing training, neuromuscular re-education, patient/caregiver education, postural training, self care, strengthening, stretching, therapeutic activities, therapeutic exercise, flexibility, functional ROM exercises, gait training and home exercise program    Frequency: 2x week  Plan of Care beginning date: 7/16/2025  Plan of Care expiration date: 9/10/2025  Treatment plan discussed with: patient      Subjective Evaluation    History of Present Illness  Mechanism of injury: surgery  Mechanism of injury: Re-Eval:  Pt is s/p R THR - posterior approach - on 4/28/25 by Dr. Darby. She had surgery at [facility]. She's recently had double cataract surgery and ortho consult for shoulder/ rotator cuff pain, so has been out of PT for a few weeks. She reports she is going to see  tomorrow for THR follow up. She also arrives without using her cane and reports she has not been for a few weeks now, expect for stairs with no HR.  Pt says she is better at going down stairs and letting her dog out, can stand/WB for longer periods of time, walking is easier, and transferring in and out of bed is easier and she doesn't need a step stool anymore.   She says she still needs improvement in her strength and stiffness/weakness is what is limiting her most. She says STS, walking her dog on a leash, and certain activities like putting on socks are still difficult/ she can't do. Pt says her pain is  "'okay' and only gets to ~2-3/10 at its worst. She reports following HEP, but the step ups are difficult for her.     IE:  She is s/p R THR - posterior approach - on 25 by Dr. Darby.  She had surgery at [facility].  She was kept 3 nights in the hospital with discharge to home.  She did have course of HHPT.  She was discharged from home care a week ago.  She did have one post-op appointment with the PA, per patient they are happy with how she is doing so far.    She was referred to OPPT and she now presents for her evaluation.  She will be going back to see the doctor in July for her follow up appointment.      The patient states that she has been having pain in her hip and along her incision.  Her pain has been constant.  She does note difficulty with dressing, especially with her shoes and socks.   She denies any N&T into RLE.    Patient Goals  Patient goals for therapy: increased motion, improved balance, decreased pain, increased strength, return to sport/leisure activities, independence with ADLs/IADLs and return to work  Patient goal: \"To get into my car easier, to do the steps normally, to get back to work and my normal life.\"  Pain  Current pain ratin  At best pain ratin  At worst pain rating: 3  Location: R Hip - hip into her thigh  Quality: discomfort  Relieving factors: rest and ice  Aggravating factors: walking (Rolling over in bed)    Social Support  Steps to enter house: yes  2  Stairs in house: yes   Lives in: multiple-level home  Lives with: spouse ( is disabled)    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    Neurological Testing     Sensation     Hip   Left Hip   Intact: light touch    Right Hip   Intact: light touch    Active Range of Motion   Left Hip   Flexion: WFL  Abduction: 35 degrees     Right Hip   Flexion: 90 degrees WFL  Abduction: 30 " "degrees     Additional Active Range of Motion Details  Bilat SLR - pain free & wnl (~90 deg)    Strength/Myotome Testing     Left Hip   Planes of Motion   Flexion: WFL  Abduction: WFL  Adduction: WFL  External rotation: WFL  Internal rotation: WFL    Right Hip   Planes of Motion   Flexion: 3+  Abduction: 3  Adduction: 3+  External rotation: 3  Internal rotation: 4-    Ambulation   Weight-Bearing Status   Assistive device used: single point cane    Additional Weight-Bearing Status Details  Walking intermittently without AD in the house.  Using SPC for outdoor ambulation.    Ambulation: Stairs   Ascend stairs: independent  Pattern: non-reciprocal  Descend stairs: independent  Pattern: non-reciprocal    Additional Stairs Ambulation Details  Has been doing them with non-reciprocal gait pattern even prior to surgery 2* h/o R TKR.      Observational Gait   Gait: antalgic   Decreased walking speed, stride length and left stance time.     General Comments:      Hip Comments   5x STS: 19 sec w/ bilat UE chair support & forward trunk lean. R. shld pain.   TU sec, w/ bilat chair UE support       Flowsheet Rows      Flowsheet Row Most Recent Value   PT/OT G-Codes    Current Score 43   Projected Score 64                       POC expires Unit limit Auth Expiration date PT/OT/ST + Visit Limit?   9/10/25 Ortho-BOMN N/A BOMN                                           Visit/Unit Tracking  AUTH Status:  Date                           N/A Used                             Remaining                                     Precautions: R THR - DOS: 25 - follow total hip precautions  PMH: R TKR, Arthritis, Anxiety, chronic pain, HTN, R shoulder surgery   RE EVAL 25     Manuals   6/   6/   R Hip    AM  KG                                              Neuro Re-Ed             SLS   5-10\" 5x   10\" 5x  10\" 5x   nt   Tandem Stance            Sidestepping   1 lap rtb   held                                           " "                 Ther Ex             Updated FOTO/measurements        Bike   5 min no resistance   5 min no resistance   5 min no resistance   5 min no resistance   HR/TR   10x   2x10  2x10  2x10   Stand SLR            Marches            Minisquats            TKE w/TBand            LAQ            QSets   10\" 10x   10\" 10x   10\" 10x   10\"x10   SLR   2x10   2x10   2x10   2x10   S/l abd   2x10  2x10  2x10 2x10   Bridges   2x10   2x10  2x10   2x10   Heel Slides             STS      Chair 10x   chair x10                Ther Activity            STS            Stepups F/L            Stepdowns            Gait Training                                      Modalities                                                                   "

## 2025-07-24 ENCOUNTER — APPOINTMENT (OUTPATIENT)
Dept: PHYSICAL THERAPY | Facility: CLINIC | Age: 70
End: 2025-07-24
Payer: MEDICARE

## 2025-07-29 ENCOUNTER — OFFICE VISIT (OUTPATIENT)
Dept: PHYSICAL THERAPY | Facility: CLINIC | Age: 70
End: 2025-07-29
Payer: MEDICARE

## 2025-07-29 DIAGNOSIS — Z96.641 PRESENCE OF RIGHT ARTIFICIAL HIP JOINT: ICD-10-CM

## 2025-07-29 DIAGNOSIS — Z74.09 IMPAIRED FUNCTIONAL MOBILITY, BALANCE, GAIT, AND ENDURANCE: Primary | ICD-10-CM

## 2025-07-29 PROCEDURE — 97110 THERAPEUTIC EXERCISES: CPT

## 2025-07-29 PROCEDURE — 97112 NEUROMUSCULAR REEDUCATION: CPT

## 2025-07-31 ENCOUNTER — OFFICE VISIT (OUTPATIENT)
Dept: PHYSICAL THERAPY | Facility: CLINIC | Age: 70
End: 2025-07-31
Payer: MEDICARE

## 2025-07-31 DIAGNOSIS — Z96.641 PRESENCE OF RIGHT ARTIFICIAL HIP JOINT: ICD-10-CM

## 2025-07-31 DIAGNOSIS — Z74.09 IMPAIRED FUNCTIONAL MOBILITY, BALANCE, GAIT, AND ENDURANCE: Primary | ICD-10-CM

## 2025-07-31 PROCEDURE — 97110 THERAPEUTIC EXERCISES: CPT

## 2025-07-31 PROCEDURE — 97112 NEUROMUSCULAR REEDUCATION: CPT
